# Patient Record
Sex: MALE | Race: WHITE | NOT HISPANIC OR LATINO | Employment: OTHER | URBAN - METROPOLITAN AREA
[De-identification: names, ages, dates, MRNs, and addresses within clinical notes are randomized per-mention and may not be internally consistent; named-entity substitution may affect disease eponyms.]

---

## 2017-04-25 DIAGNOSIS — G20 PARKINSON'S DISEASE (HCC): ICD-10-CM

## 2017-10-23 DIAGNOSIS — G20 PARKINSON'S DISEASE (HCC): ICD-10-CM

## 2017-10-26 ENCOUNTER — TELEPHONE (OUTPATIENT)
Dept: NEUROLOGY | Facility: CLINIC | Age: 82
End: 2017-10-26

## 2017-10-26 NOTE — TELEPHONE ENCOUNTER
I will refill his carbidopa/levodopa..he cancelled 3 appts with Cody Hernandes... He needs a F/U with her upon her return.    chrissy

## 2018-04-27 DIAGNOSIS — G20 PARKINSON'S DISEASE (HCC): ICD-10-CM

## 2018-08-13 ENCOUNTER — OFFICE VISIT (OUTPATIENT)
Dept: NEUROLOGY | Facility: CLINIC | Age: 83
End: 2018-08-13

## 2018-08-13 VITALS
HEART RATE: 66 BPM | HEIGHT: 73 IN | BODY MASS INDEX: 23.86 KG/M2 | OXYGEN SATURATION: 95 % | DIASTOLIC BLOOD PRESSURE: 66 MMHG | WEIGHT: 180 LBS | SYSTOLIC BLOOD PRESSURE: 130 MMHG

## 2018-08-13 DIAGNOSIS — G60.9 IDIOPATHIC PERIPHERAL NEUROPATHY: ICD-10-CM

## 2018-08-13 DIAGNOSIS — I69.30 SEQUELAE, POST-STROKE: ICD-10-CM

## 2018-08-13 DIAGNOSIS — R25.1 TREMOR: ICD-10-CM

## 2018-08-13 DIAGNOSIS — G20 PARKINSON'S DISEASE (HCC): Primary | ICD-10-CM

## 2018-08-13 DIAGNOSIS — M48.061 SPINAL STENOSIS OF LUMBAR REGION, UNSPECIFIED WHETHER NEUROGENIC CLAUDICATION PRESENT: ICD-10-CM

## 2018-08-13 PROCEDURE — 99213 OFFICE O/P EST LOW 20 MIN: CPT | Performed by: NURSE PRACTITIONER

## 2018-08-13 RX ORDER — CARVEDILOL 3.12 MG/1
3.12 TABLET ORAL 2 TIMES DAILY
COMMUNITY
Start: 2018-08-03 | End: 2018-09-02

## 2018-08-13 RX ORDER — HYDROXYZINE HYDROCHLORIDE 10 MG/1
10-20 TABLET, FILM COATED ORAL
COMMUNITY
Start: 2018-08-07 | End: 2018-09-06

## 2018-08-13 NOTE — PROGRESS NOTES
Subjective:     Patient ID: Anderson Quick is a 89 y.o. male presenting for follow up for peripheral neuropathy and tremor. Mr. Quick saw Dr. Naranjo on November 1, 2016. He has not been seen since that time. He is a previous patient of Dr. Caceres and Dr. Chun, however due to difficulty being seen by their office he was sent to see Dr. Naranjo.     He is taking gabapentin 3600 mg daily for his neuropathy which helps suppress his painful symptoms. If he misses a dose he can tell because his legs begin to ache quite a bit.     He takes carbidopa/levodopa 25/100 2 tablets three times daily for the tremor. He complains of tremor which is moderate to severe.  He can't write and he does not drive.  The tremor is mixed its present at rest intermittently as well as while doing active movements.  It affects both hands and to some degree both legs.     He has chronic back pain and his evaluation has demonstrated a severe compression fracture at L1 with 80-90% compression. There is some retropulsion at that level as well as some disc bulging at other levels but only mild spinal stenosis at any level. His CT scan report from 12/2014 showed the significant wedge fracture and also an L5 pars fracture.  He has been seen by pain management in [place] as well as Dr. Kramer and he has had 3 epidural steroid injections without much effect.  He states that while in his recliner he has no pain.  Sitting he has no pain.  Standing his pain increases and ambulating his pain increases.  It becomes quite severe.    He has history of stroke and he had evaluation 6/2012.  His evaluation included an MRI of the brain showing multifocal left frontal infarcts.  His carotid ultrasound showed less than 40% stenosis.  His echocardiogram showed moderate to severe aortic stenosis and a TAMRA was done showing grade 3 atheromatous of less than 5 mm in the ascending aorta but he also had a PFO.  A TCD with bubble study was positive for bubbles passing  through the PFO into the middle cerebral artery with Valsalva.  His Holter monitor ×2 showed sinus rhythm with some mild atrial tachycardia but no atrial fibrillation.  An EKG showed sinus rhythm.  He was anticoagulated.       Parkinson's Disease   Associated symptoms include neck pain. Pertinent negatives include no headaches, numbness or weakness.   Peripheral Neuropathy   Associated symptoms include neck pain. Pertinent negatives include no headaches, numbness or weakness.     The following portions of the patient's history were reviewed and updated as appropriate: allergies, current medications, past family history, past medical history, past social history, past surgical history and problem list.    Review of Systems   Musculoskeletal: Positive for back pain, gait problem and neck pain.   Neurological: Positive for dizziness and tremors. Negative for seizures, syncope, facial asymmetry, speech difficulty, weakness, light-headedness, numbness and headaches.   Hematological: Negative for adenopathy. Bruises/bleeds easily.   Psychiatric/Behavioral: Positive for confusion, decreased concentration and sleep disturbance. Negative for agitation, behavioral problems, dysphoric mood, hallucinations, self-injury and suicidal ideas. The patient is nervous/anxious. The patient is not hyperactive.         Objective:    Neurologic Exam  Mental status: Awake alert oriented and conversant provides a good history.  No evidence of an affective disorder, thought disorder, delusions or hallucinations.  Cranial nerves: 2-12 intact except for some reduced hearing and upgaze is perhaps reduced some.    Motor: Mild cogwheeling left arm.  Some cogwheeling right which seems to disappear with coaching.  Intermittent resting and action tremor.  Strength testing shows good strength in all muscles tested except abductor pollicis brevis muscles bilaterally are mildly weak and the extensor hallicus muscles are mildly weak.  Reflexes: Trace to  +1  Sensory: Light touch and pinprick diminished from the knees down fairly significantly in the feet.  Absent vibration sense at the ankles.  Position sense was intact in the great toes.  In the upper extremities there is perhaps mild reduction in the hands compared to the arms.  Cerebellar: Finger to nose, rapid movements are intact.  Heel-to-shin was intact.  Gait and Station: Broad-based with short steps.  Somewhat unsteady with flexed forward posture at the hips and head but he describes increased back pain with erect posture       Physical Exam  Gen.: Well-developed white male no acute distress  HEENT: Normocephalic no evidence of trauma.  He is status post cataract extractions.    Neck has some limited range of motion which appears arthritic.  It is held tilted forward.  He states that bringing it back in line increases his back pain.   Heart: Regular rate and rhythm no murmurs    Assessment/Plan:     Anderson was seen today for parkinson's disease and peripheral neuropathy.    Diagnoses and all orders for this visit:    Parkinson's disease (CMS/HCC)    Idiopathic peripheral neuropathy    Sequelae, post-stroke    Tremor    Spinal stenosis of lumbar region, unspecified whether neurogenic claudication present      Assessment:   1.  Idiopathic peripheral neuropathy-patient is on max dose gabapentin which seems to be necessary to control his painful symptoms in the legs.    2.  Chronic back pain with notable compression fracture, pars fracture and arthritis.  Only mild spinal stenosis at any level.  3.  History of stroke-good recovery.  Evaluation demonstrated a cardioembolic risk and so he is chronically anticoagulated.   4.  Mixed tremor-some features of parkinsonism despite being on carbidopa/levodopa plus action tremor.  He is on max dose gabapentin.  Primidone may interfere with warfarin.  Propranolol may increase depression.  He has history of kidney stones and so Topamax is not an option.  He is on chronic  or chronic analgesics which makes benzodiazepines risky for respiratory failure.  Unfortunately it appears that the action tremor is not treatable without notable risks of complications as alluded to here.    Plan:  1.  Continue gabapentin max dose 3600 mg per day  2.  Continue optimal risk factor control and anticoagulation  3.  No further specific recommendations on his back pain.    4.  Continue carbidopa/levodopa same dose.  Multiple potential side effect issues with medications for essential tremor    Follow up annually.

## 2018-11-14 DIAGNOSIS — G20 PARKINSON'S DISEASE (HCC): ICD-10-CM

## 2019-08-15 ENCOUNTER — OFFICE VISIT (OUTPATIENT)
Dept: NEUROLOGY | Facility: CLINIC | Age: 84
End: 2019-08-15

## 2019-08-15 VITALS
BODY MASS INDEX: 21.74 KG/M2 | DIASTOLIC BLOOD PRESSURE: 80 MMHG | WEIGHT: 164 LBS | OXYGEN SATURATION: 96 % | SYSTOLIC BLOOD PRESSURE: 124 MMHG | HEIGHT: 73 IN | HEART RATE: 57 BPM

## 2019-08-15 DIAGNOSIS — M48.062 SPINAL STENOSIS OF LUMBAR REGION WITH NEUROGENIC CLAUDICATION: ICD-10-CM

## 2019-08-15 DIAGNOSIS — I69.30 SEQUELAE, POST-STROKE: ICD-10-CM

## 2019-08-15 DIAGNOSIS — R25.1 TREMOR: ICD-10-CM

## 2019-08-15 DIAGNOSIS — G62.9 POLYNEUROPATHY: Primary | ICD-10-CM

## 2019-08-15 PROCEDURE — 99213 OFFICE O/P EST LOW 20 MIN: CPT | Performed by: NURSE PRACTITIONER

## 2019-08-15 NOTE — PROGRESS NOTES
Subjective:     Patient ID: Anderson Qucik is a 89 y.o. male presenting for follow up for peripheral neuropathy and tremor. Mr. Quick saw Dr. Naranjo on November 1, 2016. I saw him in follow up on August 13, 2018. He is a previous patient of Dr. Caceres and Dr. Chun, however due to difficulty being seen by their office he was sent to see Dr. Naranjo.      He is taking gabapentin 3600 mg daily for his neuropathy which helps suppress his painful symptoms. If he misses a dose he can tell because his legs begin to ache quite a bit.      He takes carbidopa/levodopa 25/100 2 tablets three times daily for the tremor. He complains of tremor which is moderate to severe.  He can't write and he does not drive.  The tremor is mixed its present at rest intermittently as well as while doing active movements.  It affects both hands and to some degree both legs.      He has chronic back pain and his evaluation has demonstrated a severe compression fracture at L1 with 80-90% compression. There is some retropulsion at that level as well as some disc bulging at other levels but only mild spinal stenosis at any level. His CT scan report from 12/2014 showed the significant wedge fracture and also an L5 pars fracture.  He has been seen by pain management in [place] as well as Dr. Kramer and he has had 3 epidural steroid injections without much effect.  He states that while in his recliner he has no pain.  Sitting he has no pain.  Standing his pain increases and ambulating his pain increases.  It becomes quite severe.     He has history of stroke and he had evaluation 6/2012.  His evaluation included an MRI of the brain showing multifocal left frontal infarcts.  His carotid ultrasound showed less than 40% stenosis.  His echocardiogram showed moderate to severe aortic stenosis and a TAMRA was done showing grade 3 atheromatous of less than 5 mm in the ascending aorta but he also had a PFO.  A TCD with bubble study was positive for bubbles  passing through the PFO into the middle cerebral artery with Valsalva.  His Holter monitor ×2 showed sinus rhythm with some mild atrial tachycardia but no atrial fibrillation.  An EKG showed sinus rhythm.  He was anticoagulated.       History of Present Illness  The following portions of the patient's history were reviewed and updated as appropriate: allergies, current medications, past family history, past medical history, past social history, past surgical history and problem list.    Review of Systems   Constitutional: Negative.    HENT: Negative.    Eyes: Negative.    Respiratory: Negative.    Cardiovascular: Negative.    Gastrointestinal: Negative.    Endocrine: Negative.    Genitourinary: Negative.    Musculoskeletal: Negative.    Neurological: Negative for dizziness, tremors, seizures, syncope, facial asymmetry, speech difficulty, weakness, light-headedness, numbness and headaches.   Hematological: Does not bruise/bleed easily.   Psychiatric/Behavioral: Negative for agitation, behavioral problems, confusion, decreased concentration, dysphoric mood, hallucinations, self-injury, sleep disturbance and suicidal ideas. The patient is not nervous/anxious and is not hyperactive.         Objective:    Neurologic Exam  Mental status: Awake alert oriented and conversant provides a good history.  No evidence of an affective disorder, thought disorder, delusions or hallucinations.  Cranial nerves: 2-12 intact except for some reduced hearing and upgaze is perhaps reduced some.    Motor: Mild cogwheeling left arm.  Some cogwheeling right which seems to disappear with coaching.  Intermittent resting and action tremor.  Strength testing shows good strength in all muscles tested except abductor pollicis brevis muscles bilaterally are mildly weak and the extensor hallicus muscles are mildly weak.  Reflexes: Trace to +1  Sensory: Light touch and pinprick diminished from the knees down fairly significantly in the feet.  Absent  vibration sense at the ankles.  Position sense was intact in the great toes.  In the upper extremities there is perhaps mild reduction in the hands compared to the arms.  Cerebellar: Finger to nose, rapid movements are intact.  Heel-to-shin was intact.  Gait and Station: Broad-based with short steps.  Somewhat unsteady with flexed forward posture at the hips and head but he describes increased back pain with erect posture       Physical Exam  Gen.: Well-developed white male no acute distress  HEENT: Normocephalic no evidence of trauma.  He is status post cataract extractions.    Neck has some limited range of motion which appears arthritic.  It is held tilted forward.  He states that bringing it back in line increases his back pain.   Heart: Regular rate and rhythm no murmurs     Assessment/Plan:  1.  Idiopathic peripheral neuropathy-patient is on max dose gabapentin which seems to be necessary to control his painful symptoms in the legs.    2.  Chronic back pain with notable compression fracture, pars fracture and arthritis.  Only mild spinal stenosis at any level.  3.  History of stroke-good recovery.  Evaluation demonstrated a cardioembolic risk and so he is chronically anticoagulated.   4.  Mixed tremor-some features of parkinsonism despite being on carbidopa/levodopa plus action tremor.  He is on max dose gabapentin.  Primidone may interfere with warfarin.  Propranolol may increase depression.  He has history of kidney stones and so Topamax is not an option.  He is on chronic or chronic analgesics which makes benzodiazepines risky for respiratory failure.  Unfortunately it appears that the action tremor is not treatable without notable risks of complications as alluded to here.     Anderson was seen today for parkinson's disease.    Diagnoses and all orders for this visit:    Polyneuropathy    Sequelae, post-stroke    Tremor    Spinal stenosis of lumbar region with neurogenic claudication         Plan:   1.   Continue gabapentin max dose 3600 mg per day  2.  Continue optimal risk factor control and anticoagulation  3.  No further specific recommendations on his back pain.    4.  Continue carbidopa/levodopa same dose.  Multiple potential side effect issues with medications for essential tremor     Follow up annually.

## 2019-10-25 DIAGNOSIS — G20 PARKINSON'S DISEASE (HCC): ICD-10-CM

## 2020-08-10 ENCOUNTER — APPOINTMENT (OUTPATIENT)
Dept: GENERAL RADIOLOGY | Facility: HOSPITAL | Age: 85
End: 2020-08-10

## 2020-08-10 ENCOUNTER — APPOINTMENT (OUTPATIENT)
Dept: CT IMAGING | Facility: HOSPITAL | Age: 85
End: 2020-08-10

## 2020-08-10 ENCOUNTER — HOSPITAL ENCOUNTER (OUTPATIENT)
Facility: HOSPITAL | Age: 85
Setting detail: OBSERVATION
Discharge: NURSING, REHAB OR LONG-TERM CARE | End: 2020-08-13
Attending: EMERGENCY MEDICINE | Admitting: HOSPITALIST

## 2020-08-10 DIAGNOSIS — S00.03XA CONTUSION OF SCALP, INITIAL ENCOUNTER: ICD-10-CM

## 2020-08-10 DIAGNOSIS — S93.602A FOOT SPRAIN, LEFT, INITIAL ENCOUNTER: ICD-10-CM

## 2020-08-10 DIAGNOSIS — G60.9 IDIOPATHIC PERIPHERAL NEUROPATHY: ICD-10-CM

## 2020-08-10 DIAGNOSIS — T81.49XA INFECTED SURGICAL WOUND: ICD-10-CM

## 2020-08-10 DIAGNOSIS — S82.832A CLOSED FRACTURE OF DISTAL END OF LEFT FIBULA, UNSPECIFIED FRACTURE MORPHOLOGY, INITIAL ENCOUNTER: ICD-10-CM

## 2020-08-10 DIAGNOSIS — R53.1 GENERALIZED WEAKNESS: Primary | ICD-10-CM

## 2020-08-10 LAB
ALBUMIN SERPL-MCNC: 3.5 G/DL (ref 3.5–5.2)
ALBUMIN/GLOB SERPL: 1 G/DL
ALP SERPL-CCNC: 47 U/L (ref 39–117)
ALT SERPL W P-5'-P-CCNC: <5 U/L (ref 1–41)
AMORPH URATE CRY URNS QL MICRO: ABNORMAL /HPF
ANION GAP SERPL CALCULATED.3IONS-SCNC: 12.3 MMOL/L (ref 5–15)
AST SERPL-CCNC: 11 U/L (ref 1–40)
BACTERIA UR QL AUTO: ABNORMAL /HPF
BASOPHILS # BLD AUTO: 0.05 10*3/MM3 (ref 0–0.2)
BASOPHILS NFR BLD AUTO: 0.7 % (ref 0–1.5)
BILIRUB SERPL-MCNC: 0.4 MG/DL (ref 0–1.2)
BILIRUB UR QL STRIP: NEGATIVE
BUN SERPL-MCNC: 15 MG/DL (ref 8–23)
BUN/CREAT SERPL: 18.5 (ref 7–25)
CALCIUM SPEC-SCNC: 8.9 MG/DL (ref 8.2–9.6)
CHLORIDE SERPL-SCNC: 100 MMOL/L (ref 98–107)
CLARITY UR: ABNORMAL
CO2 SERPL-SCNC: 28.7 MMOL/L (ref 22–29)
COLOR UR: ABNORMAL
CREAT SERPL-MCNC: 0.81 MG/DL (ref 0.76–1.27)
DEPRECATED RDW RBC AUTO: 52 FL (ref 37–54)
EOSINOPHIL # BLD AUTO: 0.26 10*3/MM3 (ref 0–0.4)
EOSINOPHIL NFR BLD AUTO: 3.4 % (ref 0.3–6.2)
ERYTHROCYTE [DISTWIDTH] IN BLOOD BY AUTOMATED COUNT: 14.5 % (ref 12.3–15.4)
GFR SERPL CREATININE-BSD FRML MDRD: 89 ML/MIN/1.73
GLOBULIN UR ELPH-MCNC: 3.4 GM/DL
GLUCOSE SERPL-MCNC: 95 MG/DL (ref 65–99)
GLUCOSE UR STRIP-MCNC: NEGATIVE MG/DL
HCT VFR BLD AUTO: 35.1 % (ref 37.5–51)
HGB BLD-MCNC: 11.6 G/DL (ref 13–17.7)
HGB UR QL STRIP.AUTO: ABNORMAL
HYALINE CASTS UR QL AUTO: ABNORMAL /LPF
IMM GRANULOCYTES # BLD AUTO: 0.04 10*3/MM3 (ref 0–0.05)
IMM GRANULOCYTES NFR BLD AUTO: 0.5 % (ref 0–0.5)
INR PPP: 1.1 (ref 0.9–1.1)
KETONES UR QL STRIP: ABNORMAL
LEUKOCYTE ESTERASE UR QL STRIP.AUTO: ABNORMAL
LYMPHOCYTES # BLD AUTO: 0.93 10*3/MM3 (ref 0.7–3.1)
LYMPHOCYTES NFR BLD AUTO: 12.2 % (ref 19.6–45.3)
MCH RBC QN AUTO: 32.6 PG (ref 26.6–33)
MCHC RBC AUTO-ENTMCNC: 33 G/DL (ref 31.5–35.7)
MCV RBC AUTO: 98.6 FL (ref 79–97)
MONOCYTES # BLD AUTO: 0.63 10*3/MM3 (ref 0.1–0.9)
MONOCYTES NFR BLD AUTO: 8.2 % (ref 5–12)
NEUTROPHILS NFR BLD AUTO: 5.73 10*3/MM3 (ref 1.7–7)
NEUTROPHILS NFR BLD AUTO: 75 % (ref 42.7–76)
NITRITE UR QL STRIP: NEGATIVE
NRBC BLD AUTO-RTO: 0.1 /100 WBC (ref 0–0.2)
PH UR STRIP.AUTO: 8.5 [PH] (ref 5–8)
PLATELET # BLD AUTO: 239 10*3/MM3 (ref 140–450)
PMV BLD AUTO: 10.1 FL (ref 6–12)
POTASSIUM SERPL-SCNC: 4.2 MMOL/L (ref 3.5–5.2)
PROT SERPL-MCNC: 6.9 G/DL (ref 6–8.5)
PROT UR QL STRIP: ABNORMAL
PROTHROMBIN TIME: 14.1 SECONDS (ref 11.7–14.2)
RBC # BLD AUTO: 3.56 10*6/MM3 (ref 4.14–5.8)
RBC # UR: ABNORMAL /HPF
REF LAB TEST METHOD: ABNORMAL
SODIUM SERPL-SCNC: 141 MMOL/L (ref 136–145)
SP GR UR STRIP: 1.02 (ref 1–1.03)
SQUAMOUS #/AREA URNS HPF: ABNORMAL /HPF
TROPONIN T SERPL-MCNC: <0.01 NG/ML (ref 0–0.03)
UROBILINOGEN UR QL STRIP: ABNORMAL
WBC # BLD AUTO: 7.64 10*3/MM3 (ref 3.4–10.8)
WBC UR QL AUTO: ABNORMAL /HPF

## 2020-08-10 PROCEDURE — 73610 X-RAY EXAM OF ANKLE: CPT

## 2020-08-10 PROCEDURE — U0004 COV-19 TEST NON-CDC HGH THRU: HCPCS | Performed by: PHYSICIAN ASSISTANT

## 2020-08-10 PROCEDURE — 70450 CT HEAD/BRAIN W/O DYE: CPT

## 2020-08-10 PROCEDURE — 81001 URINALYSIS AUTO W/SCOPE: CPT | Performed by: PHYSICIAN ASSISTANT

## 2020-08-10 PROCEDURE — G0378 HOSPITAL OBSERVATION PER HR: HCPCS

## 2020-08-10 PROCEDURE — C9803 HOPD COVID-19 SPEC COLLECT: HCPCS

## 2020-08-10 PROCEDURE — 85025 COMPLETE CBC W/AUTO DIFF WBC: CPT | Performed by: PHYSICIAN ASSISTANT

## 2020-08-10 PROCEDURE — 93010 ELECTROCARDIOGRAM REPORT: CPT | Performed by: INTERNAL MEDICINE

## 2020-08-10 PROCEDURE — 93005 ELECTROCARDIOGRAM TRACING: CPT | Performed by: PHYSICIAN ASSISTANT

## 2020-08-10 PROCEDURE — 80053 COMPREHEN METABOLIC PANEL: CPT | Performed by: PHYSICIAN ASSISTANT

## 2020-08-10 PROCEDURE — 73630 X-RAY EXAM OF FOOT: CPT

## 2020-08-10 PROCEDURE — 71045 X-RAY EXAM CHEST 1 VIEW: CPT

## 2020-08-10 PROCEDURE — 85610 PROTHROMBIN TIME: CPT | Performed by: PHYSICIAN ASSISTANT

## 2020-08-10 PROCEDURE — 99285 EMERGENCY DEPT VISIT HI MDM: CPT

## 2020-08-10 PROCEDURE — 84484 ASSAY OF TROPONIN QUANT: CPT | Performed by: PHYSICIAN ASSISTANT

## 2020-08-10 RX ORDER — CEPHALEXIN 500 MG/1
500 CAPSULE ORAL ONCE
Status: COMPLETED | OUTPATIENT
Start: 2020-08-10 | End: 2020-08-10

## 2020-08-10 RX ORDER — PANTOPRAZOLE SODIUM 40 MG/10ML
40 INJECTION, POWDER, LYOPHILIZED, FOR SOLUTION INTRAVENOUS
Status: DISCONTINUED | OUTPATIENT
Start: 2020-08-11 | End: 2020-08-11

## 2020-08-10 RX ORDER — OXYCODONE AND ACETAMINOPHEN 10; 325 MG/1; MG/1
1 TABLET ORAL EVERY 6 HOURS PRN
Status: DISCONTINUED | OUTPATIENT
Start: 2020-08-10 | End: 2020-08-13

## 2020-08-10 RX ORDER — ASPIRIN AND DIPYRIDAMOLE 25; 200 MG/1; MG/1
1 CAPSULE, EXTENDED RELEASE ORAL 2 TIMES DAILY
COMMUNITY

## 2020-08-10 RX ORDER — SODIUM CHLORIDE 9 MG/ML
50 INJECTION, SOLUTION INTRAVENOUS CONTINUOUS
Status: DISCONTINUED | OUTPATIENT
Start: 2020-08-10 | End: 2020-08-12

## 2020-08-10 RX ORDER — BRIMONIDINE TARTRATE AND TIMOLOL MALEATE 2; 5 MG/ML; MG/ML
1 SOLUTION OPHTHALMIC EVERY 12 HOURS
COMMUNITY

## 2020-08-10 RX ORDER — DOCUSATE SODIUM 100 MG/1
100 CAPSULE, LIQUID FILLED ORAL DAILY
COMMUNITY

## 2020-08-10 RX ORDER — OXYCODONE AND ACETAMINOPHEN 10; 325 MG/1; MG/1
1 TABLET ORAL ONCE
Status: COMPLETED | OUTPATIENT
Start: 2020-08-10 | End: 2020-08-10

## 2020-08-10 RX ORDER — TRAVOPROST OPHTHALMIC SOLUTION 0.04 MG/ML
1 SOLUTION OPHTHALMIC EVERY EVENING
COMMUNITY
End: 2020-08-10

## 2020-08-10 RX ADMIN — OXYCODONE HYDROCHLORIDE AND ACETAMINOPHEN 1 TABLET: 10; 325 TABLET ORAL at 22:24

## 2020-08-10 RX ADMIN — CEPHALEXIN 500 MG: 500 CAPSULE ORAL at 15:34

## 2020-08-10 RX ADMIN — OXYCODONE HYDROCHLORIDE AND ACETAMINOPHEN 1 TABLET: 10; 325 TABLET ORAL at 15:34

## 2020-08-10 NOTE — ED NOTES
.Pt masked on arrival, RN wearing mask/goggles during encounter       Jennie Valenzuela, RN  08/10/20 [id number]

## 2020-08-10 NOTE — ED PROVIDER NOTES
EMERGENCY DEPARTMENT ENCOUNTER    Room Number:  S515/1  Date seen:  8/10/2020  Time seen: 2:33 PM  PCP: David Ortiz MD  Historian: patient and wife at bedside      HPI:  Chief Complaint: generalized weakness, fall    A complete HPI/ROS/PMH/PSH/SH/FH are unobtainable due to: none    Context: Anderson Quick is a 89 y.o. male who presents to the ED for evaluation of generalized weakness.  It was gradual in onset 4 days ago and nothing seemed to make it worse or better.  He fell twice on the first day, injuring his left foot and ankle.  He also struck his head.  He denies any loss of consciousness, headache, vision changes nausea or vomiting.  He also denies any neck or back pain as well as any other extremity pain.  He denies fevers, chills, cough, chest pain shortness of breath abdominal pain diarrhea and dysuria.  He has noticed some intermittent hematuria recently.  He also recently had a Mohs surgery to the right cheek and it is having some drainage, they are concerned for infection.        PAST MEDICAL HISTORY  Active Ambulatory Problems     Diagnosis Date Noted   • No Active Ambulatory Problems     Resolved Ambulatory Problems     Diagnosis Date Noted   • No Resolved Ambulatory Problems     Past Medical History:   Diagnosis Date   • Arthritis    • Chronic pain    • Depression    • Movement disorder    • Peripheral neuropathy    • Stroke (CMS/HCC)          PAST SURGICAL HISTORY  Past Surgical History:   Procedure Laterality Date   • CARDIAC VALVE REPLACEMENT  2014         FAMILY HISTORY  Family History   Problem Relation Age of Onset   • Stroke Father    • Parkinsonism Paternal Aunt          SOCIAL HISTORY  Social History     Socioeconomic History   • Marital status:      Spouse name: Not on file   • Number of children: Not on file   • Years of education: Not on file   • Highest education level: Not on file   Tobacco Use   • Smoking status: Former Smoker     Packs/day: 0.25     Years: 5.00     Pack  years: 1.25     Types: Cigarettes     Start date:      Last attempt to quit: 1970     Years since quittin.6   • Smokeless tobacco: Never Used   Substance and Sexual Activity   • Alcohol use: Yes     Alcohol/week: 1.0 standard drinks     Types: 1 Shots of liquor per week     Comment: 1 drink with dinner daily    • Drug use: No         ALLERGIES  Tape  [adhesive tape]        REVIEW OF SYSTEMS  Review of Systems     All systems reviewed and negative except for those discussed in HPI.       PHYSICAL EXAM  ED Triage Vitals   Temp Heart Rate Resp BP SpO2   08/10/20 0955 08/10/20 0955 08/10/20 0955 08/10/20 0955 08/10/20 0955   97.8 °F (36.6 °C) 84 16 136/78 100 %      Temp src Heart Rate Source Patient Position BP Location FiO2 (%)   08/10/20 0955 08/10/20 0955 08/10/20 1116 08/10/20 1116 --   Tympanic Monitor Lying Left arm          GENERAL: not distressed  HENT: There is some faint yellowish bruising to the right frontotemporal area without edema or deformity.  No hemotympanum, no barnett sign or raccoon eyes or otorrhea or rhinorrhea or septal hematoma.  There is a soft tissue defect in the right cheek from recent Mohs surgery that is draining purulent drainage.  EYES: no scleral icterus, PERRLA, extraocular movements intact  CV: regular rhythm, regular rate  RESPIRATORY: normal effort CTA B  ABDOMEN: soft, nontender, nondistended  MUSCULOSKELETAL: Significant amount of ecchymosis and 2+ pitting edema to the left foot and ankle.  DP and PT pulses are 1+, sensation is intact distally and cap refill is less than 2 seconds.  Limited range of motion of the left ankle due to pain.  There is no proximal tib-fib tenderness or metatarsal tenderness.  NEURO: alert, moves all extremities, follows commands  SKIN: warm, dry    Vital signs and nursing notes reviewed.          LAB RESULTS  Recent Results (from the past 24 hour(s))   Comprehensive Metabolic Panel    Collection Time: 08/10/20 11:52 AM   Result Value Ref Range     Glucose 95 65 - 99 mg/dL    BUN 15 8 - 23 mg/dL    Creatinine 0.81 0.76 - 1.27 mg/dL    Sodium 141 136 - 145 mmol/L    Potassium 4.2 3.5 - 5.2 mmol/L    Chloride 100 98 - 107 mmol/L    CO2 28.7 22.0 - 29.0 mmol/L    Calcium 8.9 8.2 - 9.6 mg/dL    Total Protein 6.9 6.0 - 8.5 g/dL    Albumin 3.50 3.50 - 5.20 g/dL    ALT (SGPT) <5 1 - 41 U/L    AST (SGOT) 11 1 - 40 U/L    Alkaline Phosphatase 47 39 - 117 U/L    Total Bilirubin 0.4 0.0 - 1.2 mg/dL    eGFR Non African Amer 89 >60 mL/min/1.73    Globulin 3.4 gm/dL    A/G Ratio 1.0 g/dL    BUN/Creatinine Ratio 18.5 7.0 - 25.0    Anion Gap 12.3 5.0 - 15.0 mmol/L   Troponin    Collection Time: 08/10/20 11:52 AM   Result Value Ref Range    Troponin T <0.010 0.000 - 0.030 ng/mL   Protime-INR    Collection Time: 08/10/20 11:52 AM   Result Value Ref Range    Protime 14.1 11.7 - 14.2 Seconds    INR 1.10 0.90 - 1.10   CBC Auto Differential    Collection Time: 08/10/20 11:52 AM   Result Value Ref Range    WBC 7.64 3.40 - 10.80 10*3/mm3    RBC 3.56 (L) 4.14 - 5.80 10*6/mm3    Hemoglobin 11.6 (L) 13.0 - 17.7 g/dL    Hematocrit 35.1 (L) 37.5 - 51.0 %    MCV 98.6 (H) 79.0 - 97.0 fL    MCH 32.6 26.6 - 33.0 pg    MCHC 33.0 31.5 - 35.7 g/dL    RDW 14.5 12.3 - 15.4 %    RDW-SD 52.0 37.0 - 54.0 fl    MPV 10.1 6.0 - 12.0 fL    Platelets 239 140 - 450 10*3/mm3    Neutrophil % 75.0 42.7 - 76.0 %    Lymphocyte % 12.2 (L) 19.6 - 45.3 %    Monocyte % 8.2 5.0 - 12.0 %    Eosinophil % 3.4 0.3 - 6.2 %    Basophil % 0.7 0.0 - 1.5 %    Immature Grans % 0.5 0.0 - 0.5 %    Neutrophils, Absolute 5.73 1.70 - 7.00 10*3/mm3    Lymphocytes, Absolute 0.93 0.70 - 3.10 10*3/mm3    Monocytes, Absolute 0.63 0.10 - 0.90 10*3/mm3    Eosinophils, Absolute 0.26 0.00 - 0.40 10*3/mm3    Basophils, Absolute 0.05 0.00 - 0.20 10*3/mm3    Immature Grans, Absolute 0.04 0.00 - 0.05 10*3/mm3    nRBC 0.1 0.0 - 0.2 /100 WBC   Urinalysis With Microscopic If Indicated (No Culture) - Urine, Clean Catch    Collection Time:  08/10/20  2:34 PM   Result Value Ref Range    Color, UA Dark Yellow (A) Yellow, Straw    Appearance, UA Turbid (A) Clear    pH, UA 8.5 (H) 5.0 - 8.0    Specific Gravity, UA 1.024 1.005 - 1.030    Glucose, UA Negative Negative    Ketones, UA Trace (A) Negative    Bilirubin, UA Negative Negative    Blood, UA Large (3+) (A) Negative    Protein, UA 30 mg/dL (1+) (A) Negative    Leuk Esterase, UA Trace (A) Negative    Nitrite, UA Negative Negative    Urobilinogen, UA 0.2 E.U./dL 0.2 - 1.0 E.U./dL   Urinalysis, Microscopic Only - Urine, Clean Catch    Collection Time: 08/10/20  2:34 PM   Result Value Ref Range    RBC, UA 6-12 (A) None Seen, 0-2 /HPF    WBC, UA 6-12 (A) None Seen, 0-2 /HPF    Bacteria, UA None Seen None Seen /HPF    Squamous Epithelial Cells, UA 0-2 None Seen, 0-2 /HPF    Hyaline Casts, UA 3-6 None Seen /LPF    Amorphous Crystals, UA Large/3+ None Seen /HPF    Methodology Manual Light Microscopy        Ordered the above labs and independently reviewed the results.        RADIOLOGY  CT Head Without Contrast   Final Result       No evidence for acute traumatic intracranial pathology.       Radiation dose reduction techniques were utilized, including automated   exposure control and exposure modulation based on body size.       This report was finalized on 8/10/2020 3:19 PM by Dr. Samuel Shankar M.D.          XR Foot 3+ View Left   Final Result   1. Mildly displaced oblique distal fibular diametaphyseal fracture with   soft tissue swelling.   2. No evidence for active disease in the chest.       This report was finalized on 8/10/2020 1:22 PM by Dr. Gray Arboleda M.D.          XR Ankle 3+ View Left   Final Result   1. Mildly displaced oblique distal fibular diametaphyseal fracture with   soft tissue swelling.   2. No evidence for active disease in the chest.       This report was finalized on 8/10/2020 1:22 PM by Dr. Gray Arboleda M.D.          XR Chest 1 View   Final Result   1. Mildly displaced  oblique distal fibular diametaphyseal fracture with   soft tissue swelling.   2. No evidence for active disease in the chest.       This report was finalized on 8/10/2020 1:22 PM by Dr. Gray Arboleda M.D.              I ordered the above noted radiological studies. Reviewed by me and discussed with radiologist.  See dictation for official radiology interpretation.    PROCEDURES  Splint - Cast - Strapping  Date/Time: 8/10/2020 8:32 PM  Performed by: Madison Ramos PA  Authorized by: Samuel Arroyo MD     Consent:     Consent obtained:  Verbal    Consent given by:  Patient    Risks discussed:  Discoloration and numbness    Alternatives discussed:  No treatment  Pre-procedure details:     Sensation:  Normal    Skin color:  Natural  Procedure details:     Laterality:  Left    Location:  Ankle    Ankle:  L ankle    Splint type:  Sugar tong    Supplies:  Elastic bandage, Ortho-Glass and cotton padding  Post-procedure details:     Pain:  Improved    Sensation:  Normal    Skin color:  Natural    Patient tolerance of procedure:  Tolerated well, no immediate complications            MEDICATIONS GIVEN IN ER  Medications   sodium chloride 0.9 % infusion (has no administration in time range)   pantoprazole (PROTONIX) injection 40 mg (has no administration in time range)   oxyCODONE-acetaminophen (PERCOCET)  MG per tablet 1 tablet (has no administration in time range)   oxyCODONE-acetaminophen (PERCOCET)  MG per tablet 1 tablet (1 tablet Oral Given 8/10/20 1534)   cephalexin (KEFLEX) capsule 500 mg (500 mg Oral Given 8/10/20 1534)             PROGRESS AND CONSULTS    DDX includes but not limited to dehydration, electrolyte abnormality, infection such as UTI or pneumonia    ED Course as of Aug 10 2034   Mon Aug 10, 2020   1255 My interpretation of the EKG performed at 11:58 AM is sinus rhythm, rate 71.  Normal axis, 1 PVC, normal KEN QRS and QTc.  No ST elevation or ischemic T wave changes.    [KA]   1351  Troponin T: <0.010 [KA]   1351 RBC(!): 3.56 [KA]   1351 Hemoglobin(!): 11.6 [KA]   1416 I discussed the CT head with Dr. Shankar who states no acute intracranial findings.    [KA]   1445 I discussed the labs and imaging with the patient and his wife.  Though his labs are essentially unremarkable, he is still generally weak and has an acute fracture of the left ankle, further limiting his mobility.  Plan is for admission for further evaluation and treatment and they are agreeable.    [KA]   1553 I discussed the patient with Dr. Arroyo who agrees to admit to telemetry.    [KA]   2031 Bacteria, UA: None Seen [KA]   2031 Nitrite, UA: Negative [KA]      ED Course User Index  [KA] Madison Ramos PA        Reviewed pt's history and workup with Dr. Nunez.  After a bedside evaluation; they agree with the plan of care      Patient was placed in face mask in first look. Patient was wearing facemask each time I entered the room and throughout our encounter. I wore protective equipment throughout this patient encounter including a face mask, eye shield and gloves. Hand hygiene was performed before donning protective equipment and after removal when leaving the room.        DIAGNOSIS  Final diagnoses:   Generalized weakness   Closed fracture of distal end of left fibula, unspecified fracture morphology, initial encounter   Foot sprain, left, initial encounter   Infected surgical wound   Contusion of scalp, initial encounter               Latest Documented Vital Signs:  As of 20:34  BP- 117/66 HR- 75 Temp- 98 °F (36.7 °C) (Oral) O2 sat- 99%       Madison Ramos PA  08/10/20 2034

## 2020-08-10 NOTE — ED TRIAGE NOTES
Patient had multiple falls on Friday. He refused EMS both times they came to his home. He c/o lower extremity weakness/pain and left ankle pain/swelling. He also has a place on his face where he had a mole removed and he reports it may be infected.

## 2020-08-10 NOTE — PROGRESS NOTES
Discharge Planning Assessment  [facility]     Patient Name: Anderson Quick  MRN: [id number]  Today's Date: 8/10/2020    Admit Date: 8/10/2020    Discharge Needs Assessment     Row Name 08/10/20 1733       Living Environment    Lives With  spouse    Name(s) of Who Lives With Patient  Sherly Quick    Current Living Arrangements  home/apartment/condo    Primary Care Provided by  spouse/significant other    Provides Primary Care For  no one, unable/limited ability to care for self    Caregiving Concerns  Spouse states that she is not able to care for spouse if he requires transfers or lifting    Family Caregiver if Needed  none    Quality of Family Relationships  supportive    Able to Return to Prior Arrangements  other (see comments) TBD       Resource/Environmental Concerns    Resource/Environmental Concerns  none       Transition Planning    Patient/Family Anticipates Transition to  other (see comments) TBD, pt states that he is not sure what his plans are or what they should be as he has a broken ankle and he is not sure if his wife can care for him    Patient/Family Anticipated Services at Transition  ;home health care;skilled nursing    Transportation Anticipated  family or friend will provide       Discharge Needs Assessment    Readmission Within the Last 30 Days  no previous admission in last 30 days    Concerns to be Addressed  discharge planning    Concerns Comments  May need rehab placement    Equipment Currently Used at Home  cane, straight;walker, standard    Anticipated Changes Related to Illness  inability to care for self    Equipment Needed After Discharge  other (see comments) Undetermined at this time        Discharge Plan     Row Name 08/10/20 1737       Plan    Plan  Face sheet verified , role of CCP explained. Pt denies any difficulty in paying for medication. Pt d/c undetermined at this time. May need rehab or home health. Spouse stated that she cannot lift or transfer pt if he would  require that    Plan Comments  Disposition unknown at this time. Pt may need rehab or home health. Wife states that she cannot care for pt at home if he requires lifting or transfers        Destination      Coordination has not been started for this encounter.      Durable Medical Equipment      Coordination has not been started for this encounter.      Dialysis/Infusion      Coordination has not been started for this encounter.      Home Medical Care      Coordination has not been started for this encounter.      Therapy      Coordination has not been started for this encounter.      Community Resources      Coordination has not been started for this encounter.          Demographic Summary    No documentation.       Functional Status    No documentation.       Psychosocial    No documentation.       Abuse/Neglect    No documentation.       Legal    No documentation.       Substance Abuse    No documentation.       Patient Forms    No documentation.           Jacinda Rizvi RN

## 2020-08-10 NOTE — ED NOTES
Pt reports fall at home on Friday but did not want to be seen. Reports a second fall today. Pt reports hitting his head but denies LOC. Reports a generalized pain all over and rates it as a 10/10. BP currently 112/73 and breathing even and labored. 3+ pitting edema noted to LLE, currently immobilized by ems, LLQ warm and pink pedal pulse felt to RLQ. Patient was placed in face mask during first look triage.  Patient was wearing a face mask throughout encounter.  I wore personal protective equipment throughout the encounter.  Hand hygiene was performed before and after patient encounter.        Madison Colon, RN  08/10/20 1637

## 2020-08-10 NOTE — ED PROVIDER NOTES
MD ATTESTATION NOTE    The AGA and I have discussed this patient's history, physical exam, and treatment plan.  I have reviewed the documentation and personally had a face to face interaction with the patient. I affirm the documentation and agree with the treatment and plan.  The attached note describes my personal findings.    Pt with L ankle pain after fall that occurred 2 days ago. He reports that he fell d/t weakness. He does state striking his head, but denies any LOC. He is on Aggrenox.    Patient was placed in face mask in first look. Patient was wearing facemask when I entered the room and throughout our encounter. I wore full protective equipment throughout this patient encounter including a face mask, eye shield, and gloves. Hand hygiene was performed before donning protective equipment and after removal when leaving the room.    Limited physical exam:  Patient is nontoxic appearing and in NAD  HENT: R facial wound with serosanguinous drainage  Back/extremities: tenderness and swelling L ankle.    Plan to check imaging for fx.      Documentation assistance provided by madyson Barros for Dr Nunez.  Information recorded by the scribe was done at my direction and has been verified and validated by me.       Tigre Barros  08/10/20 5218       Antwon Nunez MD  08/10/20 8969

## 2020-08-10 NOTE — ED NOTES
Temporary splint applied to left LLE, cap refill <3 seconds.        Madison Colon RN  08/10/20 4845

## 2020-08-10 NOTE — PROGRESS NOTES
Clinical Pharmacy Services: Medication History    Anderson Quick is a 89 y.o. male presenting to [facility] for   Chief Complaint   Patient presents with   • Fall       He  has a past medical history of Arthritis, Chronic pain, Depression, Movement disorder, Peripheral neuropathy, and Stroke (CMS/[facility]).    Allergies as of 08/10/2020 - Reviewed 08/10/2020   Allergen Reaction Noted   • Tape  [adhesive tape]  11/01/2016       Medication information was obtained from: patient and pharmacy  Pharmacy and Phone Number:   [facility] - [address] [facility] [address] [facility] - [phone]  - [phone]   [address] [facility] [address]  Phone: [phone] Fax: [phone]    [facility] - [place]  [address] [facility] [address]  Phone: [phone] Fax: [phone]        Prior to Admission Medications     Prescriptions Last Dose Informant Patient Reported? Taking?    aspirin-dipyridamole (AGGRENOX)  MG per 12 hr capsule  Self Yes Yes    Take 1 capsule by mouth 2 (Two) Times a Day.    brimonidine-timolol (COMBIGAN) 0.2-0.5 % ophthalmic solution  Pharmacy Yes Yes    Administer 1 drop to both eyes Every 12 (Twelve) Hours.    carbidopa-levodopa (SINEMET)  MG per tablet  Self Yes Yes    Take 2 tablets by mouth 3 (Three) Times a Day.    docusate sodium (COLACE) 100 MG capsule  Self Yes Yes    Take 100 mg by mouth Daily.    gabapentin (NEURONTIN) 600 MG tablet  Self No Yes    Take 2 tablets by mouth 3 (Three) Times a Day.    oxyCODONE-acetaminophen (PERCOCET)  MG per tablet  Self Yes Yes    Take 1 tablet by mouth Every 6 (Six) Hours As Needed for moderate pain (4-6).    rosuvastatin (CRESTOR) 5 MG tablet  Self Yes Yes    Take 5 mg by mouth Daily.            Medication notes:     This medication list is complete to the best of my knowledge as of 8/10/2020    Please call if questions.    Dahiana Murillo [facility]  Medication History  Technician  [phone]    8/10/2020 15:52

## 2020-08-10 NOTE — ED NOTES
All triage performed with this RN wearing appropriate PPE.  Pt placed in mask upon arrival to ED.       Jacey Donovan, RN  08/10/20 0957

## 2020-08-11 LAB
ANION GAP SERPL CALCULATED.3IONS-SCNC: 7.9 MMOL/L (ref 5–15)
BUN SERPL-MCNC: 14 MG/DL (ref 8–23)
BUN/CREAT SERPL: 20 (ref 7–25)
CALCIUM SPEC-SCNC: 8.9 MG/DL (ref 8.2–9.6)
CHLORIDE SERPL-SCNC: 104 MMOL/L (ref 98–107)
CO2 SERPL-SCNC: 27.1 MMOL/L (ref 22–29)
CREAT SERPL-MCNC: 0.7 MG/DL (ref 0.76–1.27)
DEPRECATED RDW RBC AUTO: 50.1 FL (ref 37–54)
ERYTHROCYTE [DISTWIDTH] IN BLOOD BY AUTOMATED COUNT: 14.4 % (ref 12.3–15.4)
GFR SERPL CREATININE-BSD FRML MDRD: 105 ML/MIN/1.73
GLUCOSE SERPL-MCNC: 98 MG/DL (ref 65–99)
HCT VFR BLD AUTO: 29.9 % (ref 37.5–51)
HGB BLD-MCNC: 10.1 G/DL (ref 13–17.7)
MCH RBC QN AUTO: 32.9 PG (ref 26.6–33)
MCHC RBC AUTO-ENTMCNC: 33.8 G/DL (ref 31.5–35.7)
MCV RBC AUTO: 97.4 FL (ref 79–97)
PLATELET # BLD AUTO: 211 10*3/MM3 (ref 140–450)
PMV BLD AUTO: 9.4 FL (ref 6–12)
POTASSIUM SERPL-SCNC: 4.2 MMOL/L (ref 3.5–5.2)
RBC # BLD AUTO: 3.07 10*6/MM3 (ref 4.14–5.8)
SODIUM SERPL-SCNC: 139 MMOL/L (ref 136–145)
WBC # BLD AUTO: 5.22 10*3/MM3 (ref 3.4–10.8)

## 2020-08-11 PROCEDURE — 25010000003 CEFAZOLIN 1-4 GM/50ML-% SOLUTION: Performed by: HOSPITALIST

## 2020-08-11 PROCEDURE — 96361 HYDRATE IV INFUSION ADD-ON: CPT

## 2020-08-11 PROCEDURE — 96375 TX/PRO/DX INJ NEW DRUG ADDON: CPT

## 2020-08-11 PROCEDURE — 97110 THERAPEUTIC EXERCISES: CPT

## 2020-08-11 PROCEDURE — 97162 PT EVAL MOD COMPLEX 30 MIN: CPT

## 2020-08-11 PROCEDURE — G0378 HOSPITAL OBSERVATION PER HR: HCPCS

## 2020-08-11 PROCEDURE — 80048 BASIC METABOLIC PNL TOTAL CA: CPT | Performed by: HOSPITALIST

## 2020-08-11 PROCEDURE — 85027 COMPLETE CBC AUTOMATED: CPT | Performed by: HOSPITALIST

## 2020-08-11 RX ORDER — GABAPENTIN 300 MG/1
600 CAPSULE ORAL EVERY 8 HOURS SCHEDULED
Status: DISCONTINUED | OUTPATIENT
Start: 2020-08-11 | End: 2020-08-13 | Stop reason: HOSPADM

## 2020-08-11 RX ORDER — OXYCODONE AND ACETAMINOPHEN 10; 325 MG/1; MG/1
1 TABLET ORAL ONCE
Status: DISCONTINUED | OUTPATIENT
Start: 2020-08-11 | End: 2020-08-11

## 2020-08-11 RX ORDER — ROSUVASTATIN CALCIUM 5 MG/1
5 TABLET, COATED ORAL DAILY
Status: DISCONTINUED | OUTPATIENT
Start: 2020-08-11 | End: 2020-08-13 | Stop reason: HOSPADM

## 2020-08-11 RX ORDER — CEFAZOLIN SODIUM 1 G/50ML
1 INJECTION, SOLUTION INTRAVENOUS EVERY 8 HOURS
Status: DISCONTINUED | OUTPATIENT
Start: 2020-08-11 | End: 2020-08-13

## 2020-08-11 RX ORDER — BRIMONIDINE TARTRATE AND TIMOLOL MALEATE 2; 5 MG/ML; MG/ML
1 SOLUTION OPHTHALMIC EVERY 12 HOURS
Status: DISCONTINUED | OUTPATIENT
Start: 2020-08-11 | End: 2020-08-13 | Stop reason: HOSPADM

## 2020-08-11 RX ORDER — ASPIRIN AND DIPYRIDAMOLE 25; 200 MG/1; MG/1
1 CAPSULE, EXTENDED RELEASE ORAL 2 TIMES DAILY
Status: DISCONTINUED | OUTPATIENT
Start: 2020-08-11 | End: 2020-08-13 | Stop reason: HOSPADM

## 2020-08-11 RX ORDER — PANTOPRAZOLE SODIUM 40 MG/1
40 TABLET, DELAYED RELEASE ORAL
Status: DISCONTINUED | OUTPATIENT
Start: 2020-08-11 | End: 2020-08-13 | Stop reason: HOSPADM

## 2020-08-11 RX ORDER — CEPHALEXIN 500 MG/1
500 CAPSULE ORAL ONCE
Status: DISCONTINUED | OUTPATIENT
Start: 2020-08-11 | End: 2020-08-11

## 2020-08-11 RX ORDER — DOCUSATE SODIUM 100 MG/1
100 CAPSULE, LIQUID FILLED ORAL DAILY
Status: DISCONTINUED | OUTPATIENT
Start: 2020-08-11 | End: 2020-08-13 | Stop reason: HOSPADM

## 2020-08-11 RX ADMIN — PANTOPRAZOLE SODIUM 40 MG: 40 TABLET, DELAYED RELEASE ORAL at 14:37

## 2020-08-11 RX ADMIN — CEFAZOLIN SODIUM 1 G: 1 INJECTION, SOLUTION INTRAVENOUS at 21:02

## 2020-08-11 RX ADMIN — OXYCODONE HYDROCHLORIDE AND ACETAMINOPHEN 1 TABLET: 10; 325 TABLET ORAL at 22:08

## 2020-08-11 RX ADMIN — CARBIDOPA AND LEVODOPA 2 TABLET: 25; 100 TABLET ORAL at 21:01

## 2020-08-11 RX ADMIN — GABAPENTIN 600 MG: 300 CAPSULE ORAL at 21:43

## 2020-08-11 RX ADMIN — SODIUM CHLORIDE 75 ML/HR: 9 INJECTION, SOLUTION INTRAVENOUS at 00:36

## 2020-08-11 RX ADMIN — CEFAZOLIN SODIUM 1 G: 1 INJECTION, SOLUTION INTRAVENOUS at 14:39

## 2020-08-11 RX ADMIN — ASPRIN AND EXTENDED-RELEASE DIPYRIDAMOLE 1 CAPSULE: 25; 200 CAPSULE ORAL at 14:38

## 2020-08-11 RX ADMIN — Medication 1 APPLICATION: at 21:02

## 2020-08-11 RX ADMIN — GABAPENTIN 600 MG: 300 CAPSULE ORAL at 14:37

## 2020-08-11 RX ADMIN — ASPRIN AND EXTENDED-RELEASE DIPYRIDAMOLE 1 CAPSULE: 25; 200 CAPSULE ORAL at 21:02

## 2020-08-11 RX ADMIN — CARBIDOPA AND LEVODOPA 2 TABLET: 25; 100 TABLET ORAL at 14:38

## 2020-08-11 RX ADMIN — PANTOPRAZOLE SODIUM 40 MG: 40 INJECTION, POWDER, FOR SOLUTION INTRAVENOUS at 06:50

## 2020-08-11 NOTE — PLAN OF CARE
Problem: Patient Care Overview  Goal: Plan of Care Review  Flowsheets (Taken 8/11/2020 6279)  Plan of Care Reviewed With: patient  Outcome Summary: Pt is a 89 yo M who presents to PT with impaired functional mobility and gait secondary to L LE pain, weakness, and decreased ROm post fibular fracture. Pt may benefit from skilled PT to address strength, mobility, and gait.  Note:   Patient was not wearing a face mask during this therapy encounter. Therapist used appropriate personal protective equipment including eye protection, mask, and gloves.  Mask used was standard procedure mask. Appropriate PPE was worn during the entire therapy session. Hand hygiene was completed before and after therapy session. Patient is not in enhanced droplet precautions.

## 2020-08-11 NOTE — CONSULTS
Orthopaedic Surgery  Consult Note  Dr. Hunter Rodney .  [phone]    HPI:  Patient is a 89 y.o.  male who presents with a history of having had a fall injuring his left ankle.    MEDICAL HISTORY  Past Medical History:   Diagnosis Date   • Arthritis    • Chronic pain    • Depression    • Movement disorder     Parkinsons   • Peripheral neuropathy    • Stroke (CMS/[facility])    ·   Past Surgical History:   Procedure Laterality Date   • CARDIAC VALVE REPLACEMENT     ·   Prior to Admission medications    Medication Sig Start Date End Date Taking? Authorizing Provider   aspirin-dipyridamole (AGGRENOX)  MG per 12 hr capsule Take 1 capsule by mouth 2 (Two) Times a Day.   Yes Candice Mcgraw MD   brimonidine-timolol (COMBIGAN) 0.2-0.5 % ophthalmic solution Administer 1 drop to both eyes Every 12 (Twelve) Hours.   Yes Candice Mcgraw MD   carbidopa-levodopa (SINEMET)  MG per tablet Take 2 tablets by mouth 3 (Three) Times a Day.   Yes Candice Mcgraw MD   docusate sodium (COLACE) 100 MG capsule Take 100 mg by mouth Daily.   Yes Candice Mcgraw MD   gabapentin (NEURONTIN) 600 MG tablet Take 2 tablets by mouth 3 (Three) Times a Day. 16  Yes Surya Naranjo MD   oxyCODONE-acetaminophen (PERCOCET)  MG per tablet Take 1 tablet by mouth Every 6 (Six) Hours As Needed for moderate pain (4-6).   Yes Candice Mcgraw MD   rosuvastatin (CRESTOR) 5 MG tablet Take 5 mg by mouth Daily. 10/3/16  Yes Candice Mcgraw MD   ·   Allergies   Allergen Reactions   • Tape  [Adhesive Tape]    ·   ·   · There is no immunization history on file for this patient.  Social History     Tobacco Use   • Smoking status: Former Smoker     Packs/day: 0.25     Years: 5.00     Pack years: 1.25     Types: Cigarettes     Start date:      Last attempt to quit: 1970     Years since quittin.6   • Smokeless tobacco: Never Used   Substance Use Topics   • Alcohol use: Yes     Alcohol/week: 1.0 standard  "drinks     Types: 1 Shots of liquor per week     Comment: 1 drink with dinner daily    ·    Social History     Substance and Sexual Activity   Drug Use No   ·     VITALS: /70 (BP Location: Left arm, Patient Position: Lying)   Pulse 72   Temp 98.2 °F (36.8 °C) (Oral)   Resp 16   Ht 182.9 cm (72\")   Wt 77.1 kg (170 lb)   SpO2 95%   BMI 23.06 kg/m²  Body mass index is 23.06 kg/m².    PHYSICAL EXAM:   · On removal of the splint, left ankle exam shows she has tenderness over the fibula.  He is thin skin with bruising present.  There is no deformity.  Neurovascular exam is intact.    RADIOLOGY REVIEW:   Xr Ankle 3+ View Left    Result Date: 8/10/2020  1. Mildly displaced oblique distal fibular diametaphyseal fracture with soft tissue swelling. 2. No evidence for active disease in the chest.  This report was finalized on 8/10/2020 1:22 PM by Dr. Gray Arboleda M.D.      Xr Foot 3+ View Left    Result Date: 8/10/2020  1. Mildly displaced oblique distal fibular diametaphyseal fracture with soft tissue swelling. 2. No evidence for active disease in the chest.  This report was finalized on 8/10/2020 1:22 PM by Dr. Gray Arboleda M.D.      Ct Head Without Contrast    Result Date: 8/10/2020   No evidence for acute traumatic intracranial pathology.  Radiation dose reduction techniques were utilized, including automated exposure control and exposure modulation based on body size.  This report was finalized on 8/10/2020 3:19 PM by Dr. Samuel Shankar M.D.      Xr Chest 1 View    Result Date: 8/10/2020  1. Mildly displaced oblique distal fibular diametaphyseal fracture with soft tissue swelling. 2. No evidence for active disease in the chest.  This report was finalized on 8/10/2020 1:22 PM by Dr. Gray Arboleda M.D.        LABS:   Results for the past 24 hours:   Recent Results (from the past 24 hour(s))   CBC (No Diff)    Collection Time: 08/11/20  5:20 AM   Result Value Ref Range    WBC 5.22 3.40 - 10.80 10*3/mm3 "    RBC 3.07 (L) 4.14 - 5.80 10*6/mm3    Hemoglobin 10.1 (L) 13.0 - 17.7 g/dL    Hematocrit 29.9 (L) 37.5 - 51.0 %    MCV 97.4 (H) 79.0 - 97.0 fL    MCH 32.9 26.6 - 33.0 pg    MCHC 33.8 31.5 - 35.7 g/dL    RDW 14.4 12.3 - 15.4 %    RDW-SD 50.1 37.0 - 54.0 fl    MPV 9.4 6.0 - 12.0 fL    Platelets 211 140 - 450 10*3/mm3   Basic Metabolic Panel    Collection Time: 08/11/20  5:20 AM   Result Value Ref Range    Glucose 98 65 - 99 mg/dL    BUN 14 8 - 23 mg/dL    Creatinine 0.70 (L) 0.76 - 1.27 mg/dL    Sodium 139 136 - 145 mmol/L    Potassium 4.2 3.5 - 5.2 mmol/L    Chloride 104 98 - 107 mmol/L    CO2 27.1 22.0 - 29.0 mmol/L    Calcium 8.9 8.2 - 9.6 mg/dL    eGFR Non African Amer 105 >60 mL/min/1.73    BUN/Creatinine Ratio 20.0 7.0 - 25.0    Anion Gap 7.9 5.0 - 15.0 mmol/L       IMPRESSION:  Minimally displaced left distal fibula fracture     PLAN:   · This will not require surgery.  · I have removed his splint and put him in a below-knee fracture boot.  We  may need to have therapy adjust this.  · He may weight-bear as tolerated in the boot, but he will need a walker  · Return to office in 2 to 3 weeks for further follow-up x-rays.    Addi Rodney Sr., MD  Orthopaedic Surgery  [facility]

## 2020-08-11 NOTE — NURSING NOTE
08/11/20 1158   Wound 08/10/20 1903 Right cheek Incision   Date first assessed/Time first assessed: 08/10/20 1903   Present on Hospital Admission: Yes  Side: Right  Location: cheek  Primary Wound Type: Incision   Dressing Appearance dressing loose;moist drainage   Closure None   Base moist;pink   Periwound intact   Drainage Characteristics/Odor serous;tan   Drainage Amount small   Care, Wound cleansed with;sterile normal saline   Dressing Care, Wound dressing changed;antimicrobial agent applied;gauze   Periwound Care, Wound dry periwound area maintained   Wound 08/11/20 1130 Bilateral gluteal Pressure Injury   Date first assessed/Time first assessed: 08/11/20 1130   Present on Hospital Admission: Yes  Side: Bilateral  Location: gluteal  Primary Wound Type: Pressure Injury  Stage, Pressure Injury: Stage 2  Additional Comments: shear/ moisture   Dressing Appearance no drainage;open to air   Base clean;pink   Wound Length (cm) 2 cm   Wound Width (cm) 2 cm  (left gluteal 2x2; right gluteal excoriation)   Care, Wound   (will order magic barrier)   CWOCN consult for right cheek wound related to skin cancer removal.  Wound bed moist/ pink with tan serous drainage present.  Patient was unsure of home instructions for wound care. States he is taking an antibiotic. Wound cleaned with saline and silvasorb gel with dry dressing applied.  Patient reports chronic wounds on buttocks for approx 6 months.  He is using desitin and vaseline at home.  Right gluteal with circular partial thickness skin loss, left gluteal excoriation present.  Will request magic barrier to treat.  Left leg with soft cast in place.  Multiple areas of abrasions noted.

## 2020-08-11 NOTE — SIGNIFICANT NOTE
08/11/20 0916   Rehab Time/Intention   Evaluation Not Performed other (see comments)  (Waiting ortho consult. OT to hold.)   Rehab Treatment   Discipline occupational therapist   Recommendation   OT - Next Appointment 08/12/20

## 2020-08-11 NOTE — THERAPY EVALUATION
Patient Name: Anderson Quick  : 10/16/1927    MRN: [id number]                              Today's Date: 2020       Admit Date: 8/10/2020    Visit Dx:     ICD-10-CM ICD-9-CM   1. Generalized weakness R53.1 780.79   2. Closed fracture of distal end of left fibula, unspecified fracture morphology, initial encounter S82.832A 824.8   3. Foot sprain, left, initial encounter S93.602A 845.10   4. Infected surgical wound T81.49XA 998.59   5. Contusion of scalp, initial encounter S00.03XA 920     Patient Active Problem List   Diagnosis   • Generalized weakness     Past Medical History:   Diagnosis Date   • Arthritis    • Chronic pain    • Depression    • Movement disorder     Parkinsons   • Peripheral neuropathy    • Stroke (CMS/HCC)      Past Surgical History:   Procedure Laterality Date   • CARDIAC VALVE REPLACEMENT       General Information     Row Name 20 1653          PT Evaluation Time/Intention    Document Type  evaluation  -     Mode of Treatment  individual therapy;physical therapy  -     Row Name 20 1653          General Information    Prior Level of Function  independent:;gait;transfer;bed mobility  -     Existing Precautions/Restrictions  fall;brace worn when out of bed WBAT with fracture boot per RN  -     Barriers to Rehab  medically complex  -     Row Name 20 1653          Relationship/Environment    Lives With  spouse  -     Row Name 20 1653          Resource/Environmental Concerns    Current Living Arrangements  home/apartment/condo  -     Row Name 20 1653          Cognitive Assessment/Intervention- PT/OT    Orientation Status (Cognition)  oriented x 3  -     Row Name 20 1653          Safety Issues, Functional Mobility    Impairments Affecting Function (Mobility)  balance;endurance/activity tolerance;pain;range of motion (ROM);strength  -       User Key  (r) = Recorded By, (t) = Taken By, (c) = Cosigned By    Initials Name Provider Type      Franny Simon, PT Physical Therapist        Mobility     Row Name 08/11/20 1653          Bed Mobility Assessment/Treatment    Bed Mobility Assessment/Treatment  supine-sit;sit-supine  -     Supine-Sit S[place]y (Bed Mobility)  verbal cues;nonverbal cues (demo/gesture);moderate assist (50% patient effort)  -     Sit-Supine [place] (Bed Mobility)  verbal cues;nonverbal cues (demo/gesture);moderate assist (50% patient effort)  -     Row Name 08/11/20 1653          Sit-Stand Transfer    Sit-Stand [place] (Transfers)  verbal cues;nonverbal cues (demo/gesture);moderate assist (50% patient effort)  -     Assistive Device (Sit-Stand Transfers)  walker, front-wheeled  -     Row Name 08/11/20 1653          Gait/Stairs Assessment/Training    Sibley Level (Gait)  verbal cues;nonverbal cues (demo/gesture);moderate assist (50% patient effort)  -     Assistive Device (Gait)  walker, front-wheeled  -     Distance in Feet (Gait)  3 side steps to HOB  -     Deviations/Abnormal Patterns (Gait)  eliel decreased;gait speed decreased;stride length decreased  -     Bilateral Gait Deviations  forward flexed posture  -     Comment (Gait/Stairs)  pt maintained PWB on L LE due to pain, pt with splint on L ankle, unable to don fracture boot over splint  -       User Key  (r) = Recorded By, (t) = Taken By, (c) = Cosigned By    Initials Name Provider Type     Franny Simon, PT Physical Therapist        Obj/Interventions     [facility] Name 08/11/20 1655          General ROM    GENERAL ROM COMMENTS  L ankle ROM limited secondary to splint  -[facility] Name 08/11/20 1655          MMT (Manual Muscle Testing)    General MMT Comments  B LE generalized weakness noted with functional mobility  -[facility] Name 08/11/20 1655          Static Standing Balance    Level of S[place]y (Supported Standing, Static Balance)  minimal assist, 75% patient effort  -     Assistive Device Utilized (Supported  Standing, Static Balance)  walker, rolling  -CH     Row Name 08/11/20 1655          Dynamic Standing Balance    Level of Wells, Reaches Outside Midline (Standing, Dynamic Balance)  moderate assist, 50 to 74% patient effort  -     Assistive Device Utilized (Supported Standing, Dynamic Balance)  walker, rolling  -CH       User Key  (r) = Recorded By, (t) = Taken By, (c) = Cosigned By    Initials Name Provider Type     Franny Simon, PT Physical Therapist        Goals/Plan     Row Name 08/11/20 1657          Bed Mobility Goal 1 (PT)    Activity/Assistive Device (Bed Mobility Goal 1, PT)  bed mobility activities, all  -CH     Wells Level/Cues Needed (Bed Mobility Goal 1, PT)  supervision required  -CH     Time Frame (Bed Mobility Goal 1, PT)  1 week  -[facility] Name 08/11/20 1657          Transfer Goal 1 (PT)    Activity/Assistive Device (Transfer Goal 1, PT)  transfers, all;walker, rolling  -CH     Wells Level/Cues Needed (Transfer Goal 1, PT)  supervision required  -CH     Time Frame (Transfer Goal 1, PT)  1 week  -[facility] Name 08/11/20 1657          Gait Training Goal 1 (PT)    Activity/Assistive Device (Gait Training Goal 1, PT)  gait (walking locomotion);walker, rolling  -CH     Wells Level (Gait Training Goal 1, PT)  supervision required  -CH     Distance (Gait Goal 1, PT)  100  -CH     Time Frame (Gait Training Goal 1, PT)  1 week  -       User Key  (r) = Recorded By, (t) = Taken By, (c) = Cosigned By    Initials Name Provider Type     Franny Simon, PT Physical Therapist        Clinical Impression     Row Name 08/11/20 1656          Pain Assessment    Additional Documentation  Pain Scale: Numbers Pre/Post-Treatment (Group)  -[facility] Name 08/11/20 1656          Pain Scale: Numbers Pre/Post-Treatment    Pain Scale: Numbers, Pretreatment  5/10  -CH     Pain Scale: Numbers, Post-Treatment  7/10  -CH     Pain Location - Side  Left  -     Pain Location  ankle  -      Pain Intervention(s)  Repositioned  -     Row Name 08/11/20 1656          Plan of Care Review    Plan of Care Reviewed With  patient  -     Outcome Summary  Pt is a 89 yo M who presents to PT with impaired functional mobility and gait secondary to L LE pain, weakness, and decreased ROm post fibular fracture. Pt may benefit from skilled PT to address strength, mobility, and gait.  -     Row Name 08/11/20 1656          Physical Therapy Clinical Impression    Patient/Family Goals Statement (PT Clinical Impression)  to return to [facility]  -     Criteria for Skilled Interventions Met (PT Clinical Impression)  treatment indicated  -     Rehab Potential (PT Clinical Summary)  good, to achieve stated therapy goals  -     Row Name 08/11/20 1656          Positioning and Restraints    Pre-Treatment Position  in bed  -     Post Treatment Position  bed  -     In Bed  supine;call light within reach;encouraged to call for assist;exit alarm on  -       User Key  (r) = Recorded By, (t) = Taken By, (c) = Cosigned By    Initials Name Provider Type    Franny Carnes S, PT Physical Therapist        Outcome Measures     Row Name 08/11/20 1658          How much help from another person do you currently need...    Turning from your back to your side while in flat bed without using bedrails?  2  -CH     Moving from lying on back to sitting on the side of a flat bed without bedrails?  2  -CH     Moving to and from a bed to a chair (including a wheelchair)?  2  -CH     Standing up from a chair using your arms (e.g., wheelchair, bedside chair)?  2  -CH     Climbing 3-5 steps with a railing?  1  -CH     To walk in hospital room?  1  -CH     AM-PAC 6 Clicks Score (PT)  10  -     Row Name 08/11/20 1658          Functional Assessment    Outcome Measure Options  AM-PAC 6 Clicks Basic Mobility (PT)  -       User Key  (r) = Recorded By, (t) = Taken By, (c) = Cosigned By    Initials Name Provider Type    Franny Carnes  S, PT Physical Therapist        Physical Therapy Education                 Title: PT OT SLP Therapies (Done)     Topic: Physical Therapy (Done)     Point: Mobility training (Done)     Description:   Instruct learner(s) on safety and technique for assisting patient out of bed, chair or wheelchair.  Instruct in the proper use of assistive devices, such as walker, crutches, cane or brace.              Patient Friendly Description:   It's important to get you on your feet again, but we need to do so in a way that is safe for you. Falling has serious consequences, and your personal safety is the most important thing of all.        When it's time to get out of bed, one of us or a family member will sit next to you on the bed to give you support.     If your doctor or nurse tells you to use a walker, crutches, a cane, or a brace, be sure you use it every time you get out of bed, even if you think you don't need it.    Learning Progress Summary           Patient Acceptance, E,TB,D, VU,NR by  at 8/11/2020 1658                   Point: Home exercise program (Done)     Description:   Instruct learner(s) on appropriate technique for monitoring, assisting and/or progressing patient with therapeutic exercises and activities.              Learning Progress Summary           Patient Acceptance, E,TB,D, VU,NR by  at 8/11/2020 1658                   Point: Body mechanics (Done)     Description:   Instruct learner(s) on proper positioning and spine alignment for patient and/or caregiver during mobility tasks and/or exercises.              Learning Progress Summary           Patient Acceptance, E,TB,D, VU,NR by  at 8/11/2020 1658                   Point: Precautions (Done)     Description:   Instruct learner(s) on prescribed precautions during mobility and gait tasks              Learning Progress Summary           Patient Acceptance, E,TB,D, VU,NR by  at 8/11/2020 1658                               User Key     Initials  Effective Dates Name Provider Type Discipline     04/03/18 -  Franny Simon PT Physical Therapist PT              PT Recommendation and Plan  Planned Therapy Interventions (PT Eval): balance training, bed mobility training, gait training, home exercise program, patient/family education, strengthening, transfer training  Outcome Summary/Treatment Plan (PT)  Anticipated Discharge Disposition (PT): skilled nursing facility  Plan of Care Reviewed With: patient  Outcome Summary: Pt is a 89 yo M who presents to PT with impaired functional mobility and gait secondary to L LE pain, weakness, and decreased ROm post fibular fracture. Pt may benefit from skilled PT to address strength, mobility, and gait.     Time Calculation:   PT Charges     Row Name 08/11/20 1659             Time Calculation    Start Time  1530  -      Stop Time  1543  -      Time Calculation (min)  13 min  -      PT Received On  08/11/20  -      PT - Next Appointment  08/12/20  -      PT Goal Re-Cert Due Date  08/18/20  -         Time Calculation- PT    Total Timed Code Minutes- PT  8 minute(s)  -        User Key  (r) = Recorded By, (t) = Taken By, (c) = Cosigned By    Initials Name Provider Type     Franny Simon PT Physical Therapist        Therapy Charges for Today     Code Description Service Date Service Provider Modifiers Qty    91339247041 HC PT EVAL MOD COMPLEXITY 2 8/11/2020 Franny Simon, PT GP 1    82292352173 HC PT THER PROC EA 15 MIN 8/11/2020 Franny Simon, PT GP 1          PT G-Codes  Outcome Measure Options: AM-PAC 6 Clicks Basic Mobility (PT)  AM-PAC 6 Clicks Score (PT): 10    Franny Simon PT  8/11/2020

## 2020-08-11 NOTE — PLAN OF CARE
Pt alert and oriented x4. Pt reported pain of 7 to left lower extremity once. Pain medications adminsitered as ordered. Left lower extremity elevated on pillows. No increased warmth, tightness or increased pain this shift. Pt reported pain medications effective. Vital signs within normal limits for patient. Resp even and unlabored.

## 2020-08-11 NOTE — H&P
History and physical    Primary care physician  Dr. David Ortiz    Chief complaint  Status post fall x2  Generalized weakness  Left ankle foot pain    History of present illness  89-year-old white male with history of Parkinson's disease stroke neuropathy movement disorder depression osteoarthritis hyperlipidemia who lives at home with his wife brought to the emergency room with multiple falls generalized weakness left ankle foot pain.  Patient has injured the foot few days ago.  Patient fully alert oriented complaining of pain at the injury site but no other complaint.  Patient evaluated in ER found to have possible infected surgical wound as he has recent surgery on right cheek with some drainage but no fever chills.  Patient also have distal left fibula fracture and left foot sprain.  Patient has multiple bruising and contusion of the scalp on evaluation.  Patient admitted for management    PAST MEDICAL HISTORY  • Arthritis     • Chronic pain     • Depression     • Movement disorder     • Peripheral neuropathy     • Stroke (CMS/[facility])        PAST SURGICAL HISTORY  Surgical History         Past Surgical History:   Procedure Laterality Date   • CARDIAC VALVE REPLACEMENT           FAMILY HISTORY        Family History   Problem Relation Age of Onset   • Stroke Father     • Parkinsonism Paternal Aunt        SOCIAL HISTORY  Social History   Social History            Socioeconomic History   • Marital status:        Spouse name: Not on file   • Number of children: Not on file   • Years of education: Not on file   • Highest education level: Not on file   Tobacco Use   • Smoking status: Former Smoker       Packs/day: 0.25       Years: 5.00       Pack years: 1.25       Types: Cigarettes       Start date:        Last attempt to quit: 1970       Years since quittin.6   • Smokeless tobacco: Never Used   Substance and Sexual Activity   • Alcohol use: Yes       Alcohol/week: 1.0 standard drinks       Types:  "1 Shots of liquor per week       Comment: 1 drink with dinner daily    • Drug use: No         ALLERGIES  Tape  [adhesive tape]  Home medications reviewed     REVIEW OF SYSTEMS  All systems reviewed and negative except for those discussed in HPI.      PHYSICAL EXAM  Blood pressure 123/71, pulse 72, temperature 98.6 °F (37 °C), temperature source Oral, resp. rate 16, height 182.9 cm (72\"), weight 77.1 kg (170 lb), SpO2 95 %.    GENERAL: Not distressed  HENT: Mild bruising  EYES: no scleral icterus, PERRLA, extraocular movements intact  CV: regular rhythm, regular rate  RESPIRATORY: normal effort CTA B  ABDOMEN: soft, nontender, nondistended  MUSCULOSKELETAL: Significant amount of ecchymosis and 2+ pitting edema to the left foot and ankle.  DP and PT pulses are 1+, sensation is intact distally and cap refill is less than 2 seconds.  Limited range of motion of the left ankle due to pain.  There is no proximal tib-fib tenderness or metatarsal tenderness.  NEURO: alert, moves all extremities, follows commands  SKIN: warm, dry     LAB RESULTS  Lab Results (last 24 hours)     Procedure Component Value Units Date/Time    Basic Metabolic Panel [[id number]]  (Abnormal) Collected:  08/11/20 0520    Specimen:  Blood Updated:  08/11/20 0609     Glucose 98 mg/dL      BUN 14 mg/dL      Creatinine 0.70 mg/dL      Sodium 139 mmol/L      Potassium 4.2 mmol/L      Chloride 104 mmol/L      CO2 27.1 mmol/L      Calcium 8.9 mg/dL      eGFR Non African Amer 105 mL/min/1.73      BUN/Creatinine Ratio 20.0     Anion Gap 7.9 mmol/L     Narrative:       GFR Normal >60  Chronic Kidney Disease <60  Kidney Failure <15      CBC (No Diff) [[id number]]  (Abnormal) Collected:  08/11/20 0520    Specimen:  Blood Updated:  08/11/20 0542     WBC 5.22 10*3/mm3      RBC 3.07 10*6/mm3      Hemoglobin 10.1 g/dL      Hematocrit 29.9 %      MCV 97.4 fL      MCH 32.9 pg      MCHC 33.8 g/dL      RDW 14.4 %      RDW-SD 50.1 fl      MPV 9.4 fL      Platelets 211 " 10*3/mm3     COVID PRE-OP / PRE-PROCEDURE SCREENING ORDER (NO ISOLATION) - Swab, Nasopharynx [[id number]] Collected:  08/10/20 1604    Specimen:  Swab from Nasopharynx Updated:  08/10/20 1622    Narrative:       The following orders were created for panel order COVID PRE-OP / PRE-PROCEDURE SCREENING ORDER (NO ISOLATION) - Swab, Nasopharynx.  Procedure                               Abnormality         Status                     ---------                               -----------         ------                     COVID-19,BIOTAP, NP/OP S...[[id number]]                      In process                   Please view results for these tests on the individual orders.    COVID-19,BIOTAP, NP/OP SWAB IN TRANSPORT MEDIA OR SALINE 24-36 HR TAT - Swab, Nasopharynx [[id number]] Collected:  08/10/20 1604    Specimen:  Swab from Nasopharynx Updated:  08/10/20 1622    Urinalysis, Microscopic Only - Urine, Clean Catch [[id number]]  (Abnormal) Collected:  08/10/20 1434    Specimen:  Urine, Clean Catch Updated:  08/10/20 1513     RBC, UA 6-12 /HPF      WBC, UA 6-12 /HPF      Bacteria, UA None Seen /HPF      Squamous Epithelial Cells, UA 0-2 /HPF      Hyaline Casts, UA 3-6 /LPF      Amorphous Crystals, UA Large/3+ /HPF      Methodology Manual Light Microscopy    Urinalysis With Microscopic If Indicated (No Culture) - Urine, Clean Catch [[id number]]  (Abnormal) Collected:  08/10/20 1434    Specimen:  Urine, Clean Catch Updated:  08/10/20 1500     Color, UA Dark Yellow     Appearance, UA Turbid     pH, UA 8.5     Specific Gravity, UA 1.024     Glucose, UA Negative     Ketones, UA Trace     Bilirubin, UA Negative     Blood, UA Large (3+)     Protein, UA 30 mg/dL (1+)     Leuk Esterase, UA Trace     Nitrite, UA Negative     Urobilinogen, UA 0.2 E.U./dL    Comprehensive Metabolic Panel [[id number]] Collected:  08/10/20 1152    Specimen:  Blood Updated:  08/10/20 1232     Glucose 95 mg/dL      BUN 15 mg/dL      Creatinine 0.81 mg/dL      Sodium  141 mmol/L      Potassium 4.2 mmol/L      Chloride 100 mmol/L      CO2 28.7 mmol/L      Calcium 8.9 mg/dL      Total Protein 6.9 g/dL      Albumin 3.50 g/dL      ALT (SGPT) <5 U/L      AST (SGOT) 11 U/L      Alkaline Phosphatase 47 U/L      Total Bilirubin 0.4 mg/dL      eGFR Non African Amer 89 mL/min/1.73      Globulin 3.4 gm/dL      A/G Ratio 1.0 g/dL      BUN/Creatinine Ratio 18.5     Anion Gap 12.3 mmol/L     Narrative:       GFR Normal >60  Chronic Kidney Disease <60  Kidney Failure <15      Troponin [[id number]]  (Normal) Collected:  08/10/20 1152    Specimen:  Blood Updated:  08/10/20 1232     Troponin T <0.010 ng/mL     Narrative:       Troponin T Reference Range:  <= 0.03 ng/mL-   Negative for AMI  >0.03 ng/mL-     Abnormal for myocardial necrosis.  Clinicians would have to utilize clinical acumen, EKG, Troponin and serial changes to determine if it is an Acute Myocardial Infarction or myocardial injury due to an underlying chronic condition.       Results may be falsely decreased if patient taking Biotin.      Protime-INR [[id number]]  (Normal) Collected:  08/10/20 1152    Specimen:  Blood Updated:  08/10/20 1226     Protime 14.1 Seconds      INR 1.10    CBC & Differential [[id number]] Collected:  08/10/20 1152    Specimen:  Blood Updated:  08/10/20 1210    Narrative:       The following orders were created for panel order CBC & Differential.  Procedure                               Abnormality         Status                     ---------                               -----------         ------                     CBC Auto Differential[[id number]]        Abnormal            Final result                 Please view results for these tests on the individual orders.    CBC Auto Differential [[id number]]  (Abnormal) Collected:  08/10/20 1152    Specimen:  Blood Updated:  08/10/20 1210     WBC 7.64 10*3/mm3      RBC 3.56 10*6/mm3      Hemoglobin 11.6 g/dL      Hematocrit 35.1 %      MCV 98.6 fL      MCH 32.6 pg       MCHC 33.0 g/dL      RDW 14.5 %      RDW-SD 52.0 fl      MPV 10.1 fL      Platelets 239 10*3/mm3      Neutrophil % 75.0 %      Lymphocyte % 12.2 %      Monocyte % 8.2 %      Eosinophil % 3.4 %      Basophil % 0.7 %      Immature Grans % 0.5 %      Neutrophils, Absolute 5.73 10*3/mm3      Lymphocytes, Absolute 0.93 10*3/mm3      Monocytes, Absolute 0.63 10*3/mm3      Eosinophils, Absolute 0.26 10*3/mm3      Basophils, Absolute 0.05 10*3/mm3      Immature Grans, Absolute 0.04 10*3/mm3      nRBC 0.1 /100 WBC         Imaging Results (Last 24 Hours)     Procedure Component Value Units Date/Time    CT Head Without Contrast [[id number]] Collected:  08/10/20 1518     Updated:  08/10/20 1522    Narrative:       CT HEAD WITHOUT CONTRAST     CLINICAL HISTORY: Right frontotemporal head injury. On Aggrenox.     CT scan head was obtained with 2 mm axial bone algorithm and 3 mm axial  soft tissue algorithm images. No intravenous contrast was administered.     FINDINGS:     There is no evidence for a calvarial fracture. There is no evidence for  an acute extra-axial hemorrhage. The ventricles, sulci, and cisterns are  age appropriate. Moderate changes of chronic small vessel ischemic  phenomena are noted. The gray-white matter differentiation is within  normal limits. The basal ganglia and thalami are unremarkable in  appearance. The posterior fossa structures are within normal limits.       Impression:          No evidence for acute traumatic intracranial pathology.     Radiation dose reduction techniques were utilized, including automated  exposure control and exposure modulation based on body size.     This report was finalized on 8/10/2020 3:19 PM by Dr. Samuel Shankar M.D.       XR Chest 1 View [[id number]] Collected:  08/10/20 1320     Updated:  08/10/20 1325    Narrative:       LEFT ANKLE, LEFT FOOT, CHEST     HISTORY: Left foot pain after falling.     COMPARISON: None.     FINDINGS:  AP PORTABLE UPRIGHT CHEST:  Cardiomediastinal silhouette is within normal  limits.  There are median sternotomy wires and aortic atherosclerotic  calcifications are present. There are chronic-appearing increased  interstitial markings though the lungs appear clear of focal airspace  disease and there is no evidence for pneumothorax. Bones appear  osteopenic.     LEFT ANKLE: 3 VIEWS: There is an oblique distal fibular diametaphyseal  fracture. The fracture begins at the lateral fibular cortex 7.7 cm above  the fibular tip and extends obliquely distally into the distal  tibiofibular articulation with 4 mm lateral displacement. There is  overlying soft tissue swelling. Vascular calcifications are noted.     LEFT FOOT: 3 VIEWS:  Midfoot alignment is within normal limits. There  are chronic degenerative changes at the 1st MTP joint. No fracture or  acute osseous abnormality is evident. There are chronic degenerative  changes at the 1st MTP joint.       Impression:       1. Mildly displaced oblique distal fibular diametaphyseal fracture with  soft tissue swelling.  2. No evidence for active disease in the chest.     This report was finalized on 8/10/2020 1:22 PM by Dr. Gray Arboleda M.D.       XR Foot 3+ View Left [[id number]] Collected:  08/10/20 1320     Updated:  08/10/20 1325    Narrative:       LEFT ANKLE, LEFT FOOT, CHEST     HISTORY: Left foot pain after falling.     COMPARISON: None.     FINDINGS:  AP PORTABLE UPRIGHT CHEST: Cardiomediastinal silhouette is within normal  limits.  There are median sternotomy wires and aortic atherosclerotic  calcifications are present. There are chronic-appearing increased  interstitial markings though the lungs appear clear of focal airspace  disease and there is no evidence for pneumothorax. Bones appear  osteopenic.     LEFT ANKLE: 3 VIEWS: There is an oblique distal fibular diametaphyseal  fracture. The fracture begins at the lateral fibular cortex 7.7 cm above  the fibular tip and extends obliquely  distally into the distal  tibiofibular articulation with 4 mm lateral displacement. There is  overlying soft tissue swelling. Vascular calcifications are noted.     LEFT FOOT: 3 VIEWS:  Midfoot alignment is within normal limits. There  are chronic degenerative changes at the 1st MTP joint. No fracture or  acute osseous abnormality is evident. There are chronic degenerative  changes at the 1st MTP joint.       Impression:       1. Mildly displaced oblique distal fibular diametaphyseal fracture with  soft tissue swelling.  2. No evidence for active disease in the chest.     This report was finalized on 8/10/2020 1:22 PM by Dr. Gray Arboleda M.D.       XR Ankle 3+ View Left [[id number]] Collected:  08/10/20 1320     Updated:  08/10/20 1325    Narrative:       LEFT ANKLE, LEFT FOOT, CHEST     HISTORY: Left foot pain after falling.     COMPARISON: None.     FINDINGS:  AP PORTABLE UPRIGHT CHEST: Cardiomediastinal silhouette is within normal  limits.  There are median sternotomy wires and aortic atherosclerotic  calcifications are present. There are chronic-appearing increased  interstitial markings though the lungs appear clear of focal airspace  disease and there is no evidence for pneumothorax. Bones appear  osteopenic.     LEFT ANKLE: 3 VIEWS: There is an oblique distal fibular diametaphyseal  fracture. The fracture begins at the lateral fibular cortex 7.7 cm above  the fibular tip and extends obliquely distally into the distal  tibiofibular articulation with 4 mm lateral displacement. There is  overlying soft tissue swelling. Vascular calcifications are noted.     LEFT FOOT: 3 VIEWS:  Midfoot alignment is within normal limits. There  are chronic degenerative changes at the 1st MTP joint. No fracture or  acute osseous abnormality is evident. There are chronic degenerative  changes at the 1st MTP joint.       Impression:       1. Mildly displaced oblique distal fibular diametaphyseal fracture with  soft tissue  swelling.  2. No evidence for active disease in the chest.     This report was finalized on 8/10/2020 1:22 PM by Dr. Gray Arboleda M.D.           ECG 12 Lead        HEART RATE= 71  bpm  RR Interval= 876  ms  MT Interval= 177  ms  P Horizontal Axis=   deg  P Front Axis= -16  deg  QRSD Interval= 86  ms  QT Interval= 416  ms  QRS Axis= -15  deg  T Wave Axis= 24  deg  - OTHERWISE NORMAL ECG -  Sinus rhythm  Ventricular premature complex  Borderline left axis deviation  NO PRIOR TRACING AVAILABLE FOR COMPARISON             Current Facility-Administered Medications:   •  aspirin-dipyridamole (AGGRENOX)  MG per 12 hr capsule 1 capsule, 1 capsule, Oral, BID, Samuel Arroyo MD  •  brimonidine-timolol (COMBIGAN) 0.2-0.5 % ophthalmic solution 1 drop, 1 drop, Both Eyes, Q12H, Samuel Arroyo MD  •  carbidopa-levodopa (SINEMET)  MG per tablet 2 tablet, 2 tablet, Oral, TID, Samuel Arroyo MD  •  docusate sodium (COLACE) capsule 100 mg, 100 mg, Oral, Daily, Samuel Arroyo MD  •  gabapentin (NEURONTIN) capsule 600 mg, 600 mg, Oral, Q8H, Samuel Arroyo MD  •  oxyCODONE-acetaminophen (PERCOCET)  MG per tablet 1 tablet, 1 tablet, Oral, Q6H PRN, Samuel Arroyo MD, 1 tablet at 08/10/20 2224  •  pantoprazole (PROTONIX) EC tablet 40 mg, 40 mg, Oral, Q AM, Samuel Arroyo MD  •  rosuvastatin (CRESTOR) tablet 5 mg, 5 mg, Oral, Daily, Samuel Arroyo MD  •  sodium chloride 0.9 % infusion, 75 mL/hr, Intravenous, Continuous, Samuel Arroyo MD, Last Rate: 75 mL/hr at 08/11/20 1023, 75 mL/hr at 08/11/20 1023     ASSESSMENT  Left distal fibula fracture  Left foot sprain  Status post multiple falls  Right cheek wound infection  Parkinson disease  Movement disorder  Status post CVA in the past  Hyperlipidemia  Neuropathy  Osteoarthritis  Gastroesophageal reflux disease    PLAN  Admit  IV antibiotics  Pain management  Orthopedic surgery consult  Adjust home medications  Stress ulcer DVT prophylaxis  PT OT  Supportive care  Patient is full  code  Discussed with nursing staff  Follow closely further recommendation current hospital course    ELVIA MCCLAIN MD

## 2020-08-12 LAB
ALBUMIN SERPL-MCNC: 3.3 G/DL (ref 3.5–5.2)
ALBUMIN/GLOB SERPL: 1 G/DL
ALP SERPL-CCNC: 43 U/L (ref 39–117)
ALT SERPL W P-5'-P-CCNC: <5 U/L (ref 1–41)
ANION GAP SERPL CALCULATED.3IONS-SCNC: 5.8 MMOL/L (ref 5–15)
AST SERPL-CCNC: 12 U/L (ref 1–40)
BILIRUB SERPL-MCNC: 0.6 MG/DL (ref 0–1.2)
BUN SERPL-MCNC: 12 MG/DL (ref 8–23)
BUN/CREAT SERPL: 16.4 (ref 7–25)
CALCIUM SPEC-SCNC: 9.1 MG/DL (ref 8.2–9.6)
CHLORIDE SERPL-SCNC: 105 MMOL/L (ref 98–107)
CHOLEST SERPL-MCNC: 117 MG/DL (ref 0–200)
CO2 SERPL-SCNC: 27.2 MMOL/L (ref 22–29)
CREAT SERPL-MCNC: 0.73 MG/DL (ref 0.76–1.27)
DEPRECATED RDW RBC AUTO: 50.3 FL (ref 37–54)
ERYTHROCYTE [DISTWIDTH] IN BLOOD BY AUTOMATED COUNT: 14.4 % (ref 12.3–15.4)
GFR SERPL CREATININE-BSD FRML MDRD: 100 ML/MIN/1.73
GLOBULIN UR ELPH-MCNC: 3.4 GM/DL
GLUCOSE SERPL-MCNC: 95 MG/DL (ref 65–99)
HBA1C MFR BLD: 5.3 % (ref 4.8–5.6)
HCT VFR BLD AUTO: 29.8 % (ref 37.5–51)
HDLC SERPL-MCNC: 48 MG/DL (ref 40–60)
HGB BLD-MCNC: 10.2 G/DL (ref 13–17.7)
LDLC SERPL CALC-MCNC: 56 MG/DL (ref 0–100)
LDLC/HDLC SERPL: 1.18 {RATIO}
MCH RBC QN AUTO: 32.8 PG (ref 26.6–33)
MCHC RBC AUTO-ENTMCNC: 34.2 G/DL (ref 31.5–35.7)
MCV RBC AUTO: 95.8 FL (ref 79–97)
NT-PROBNP SERPL-MCNC: 1271 PG/ML (ref 0–1800)
PLATELET # BLD AUTO: 232 10*3/MM3 (ref 140–450)
PMV BLD AUTO: 9.6 FL (ref 6–12)
POTASSIUM SERPL-SCNC: 4.3 MMOL/L (ref 3.5–5.2)
PROT SERPL-MCNC: 6.7 G/DL (ref 6–8.5)
RBC # BLD AUTO: 3.11 10*6/MM3 (ref 4.14–5.8)
REF LAB TEST METHOD: NORMAL
SARS-COV-2 RNA RESP QL NAA+PROBE: NOT DETECTED
SODIUM SERPL-SCNC: 138 MMOL/L (ref 136–145)
TRIGL SERPL-MCNC: 63 MG/DL (ref 0–150)
TSH SERPL DL<=0.05 MIU/L-ACNC: 1.7 UIU/ML (ref 0.27–4.2)
VLDLC SERPL-MCNC: 12.6 MG/DL (ref 5–40)
WBC # BLD AUTO: 4.96 10*3/MM3 (ref 3.4–10.8)

## 2020-08-12 PROCEDURE — 97110 THERAPEUTIC EXERCISES: CPT

## 2020-08-12 PROCEDURE — 85027 COMPLETE CBC AUTOMATED: CPT | Performed by: HOSPITALIST

## 2020-08-12 PROCEDURE — 25010000003 CEFAZOLIN 1-4 GM/50ML-% SOLUTION: Performed by: HOSPITALIST

## 2020-08-12 PROCEDURE — G0378 HOSPITAL OBSERVATION PER HR: HCPCS

## 2020-08-12 PROCEDURE — 84443 ASSAY THYROID STIM HORMONE: CPT | Performed by: HOSPITALIST

## 2020-08-12 PROCEDURE — 83880 ASSAY OF NATRIURETIC PEPTIDE: CPT | Performed by: HOSPITALIST

## 2020-08-12 PROCEDURE — 80053 COMPREHEN METABOLIC PANEL: CPT | Performed by: HOSPITALIST

## 2020-08-12 PROCEDURE — 83036 HEMOGLOBIN GLYCOSYLATED A1C: CPT | Performed by: HOSPITALIST

## 2020-08-12 PROCEDURE — 96366 THER/PROPH/DIAG IV INF ADDON: CPT

## 2020-08-12 PROCEDURE — 80061 LIPID PANEL: CPT | Performed by: HOSPITALIST

## 2020-08-12 PROCEDURE — 97116 GAIT TRAINING THERAPY: CPT

## 2020-08-12 PROCEDURE — 96365 THER/PROPH/DIAG IV INF INIT: CPT

## 2020-08-12 RX ADMIN — GABAPENTIN 600 MG: 300 CAPSULE ORAL at 06:03

## 2020-08-12 RX ADMIN — Medication 1 APPLICATION: at 09:06

## 2020-08-12 RX ADMIN — OXYCODONE HYDROCHLORIDE AND ACETAMINOPHEN 1 TABLET: 10; 325 TABLET ORAL at 15:39

## 2020-08-12 RX ADMIN — ASPRIN AND EXTENDED-RELEASE DIPYRIDAMOLE 1 CAPSULE: 25; 200 CAPSULE ORAL at 09:06

## 2020-08-12 RX ADMIN — GABAPENTIN 600 MG: 300 CAPSULE ORAL at 13:46

## 2020-08-12 RX ADMIN — BRIMONIDINE TARTRATE, TIMOLOL MALEATE 1 DROP: 2; 5 SOLUTION/ DROPS OPHTHALMIC at 06:03

## 2020-08-12 RX ADMIN — ROSUVASTATIN CALCIUM 5 MG: 5 TABLET, FILM COATED ORAL at 09:06

## 2020-08-12 RX ADMIN — OXYCODONE HYDROCHLORIDE AND ACETAMINOPHEN 1 TABLET: 10; 325 TABLET ORAL at 21:00

## 2020-08-12 RX ADMIN — DOCUSATE SODIUM 100 MG: 100 CAPSULE, LIQUID FILLED ORAL at 09:06

## 2020-08-12 RX ADMIN — CARBIDOPA AND LEVODOPA 2 TABLET: 25; 100 TABLET ORAL at 09:06

## 2020-08-12 RX ADMIN — GABAPENTIN 600 MG: 300 CAPSULE ORAL at 20:56

## 2020-08-12 RX ADMIN — PANTOPRAZOLE SODIUM 40 MG: 40 TABLET, DELAYED RELEASE ORAL at 06:03

## 2020-08-12 RX ADMIN — BRIMONIDINE TARTRATE, TIMOLOL MALEATE 1 DROP: 2; 5 SOLUTION/ DROPS OPHTHALMIC at 12:02

## 2020-08-12 RX ADMIN — ASPRIN AND EXTENDED-RELEASE DIPYRIDAMOLE 1 CAPSULE: 25; 200 CAPSULE ORAL at 20:56

## 2020-08-12 RX ADMIN — Medication 1 APPLICATION: at 20:57

## 2020-08-12 RX ADMIN — CEFAZOLIN SODIUM 1 G: 1 INJECTION, SOLUTION INTRAVENOUS at 04:42

## 2020-08-12 RX ADMIN — CEFAZOLIN SODIUM 1 G: 1 INJECTION, SOLUTION INTRAVENOUS at 12:02

## 2020-08-12 RX ADMIN — CARBIDOPA AND LEVODOPA 2 TABLET: 25; 100 TABLET ORAL at 15:39

## 2020-08-12 RX ADMIN — SODIUM CHLORIDE 50 ML/HR: 9 INJECTION, SOLUTION INTRAVENOUS at 06:05

## 2020-08-12 RX ADMIN — CARBIDOPA AND LEVODOPA 2 TABLET: 25; 100 TABLET ORAL at 20:56

## 2020-08-12 RX ADMIN — CEFAZOLIN SODIUM 1 G: 1 INJECTION, SOLUTION INTRAVENOUS at 20:56

## 2020-08-12 RX ADMIN — OXYCODONE HYDROCHLORIDE AND ACETAMINOPHEN 1 TABLET: 10; 325 TABLET ORAL at 09:20

## 2020-08-12 NOTE — THERAPY TREATMENT NOTE
Patient Name: Anderson Quick  : 10/16/1927    MRN: [id number]                              Today's Date: 2020       Admit Date: 8/10/2020    Visit Dx:     ICD-10-CM ICD-9-CM   1. Generalized weakness R53.1 780.79   2. Closed fracture of distal end of left fibula, unspecified fracture morphology, initial encounter S82.832A 824.8   3. Foot sprain, left, initial encounter S93.602A 845.10   4. Infected surgical wound T81.49XA 998.59   5. Contusion of scalp, initial encounter S00.03XA 920     Patient Active Problem List   Diagnosis   • Generalized weakness     Past Medical History:   Diagnosis Date   • Arthritis    • Chronic pain    • Depression    • Movement disorder     Parkinsons   • Peripheral neuropathy    • Stroke (CMS/HCC)      Past Surgical History:   Procedure Laterality Date   • CARDIAC VALVE REPLACEMENT       General Information     Row Name 20 1440          PT Evaluation Time/Intention    Document Type  therapy note (daily note)  -     Mode of Treatment  physical therapy  -[facility] Name 20 1440          General Information    Existing Precautions/Restrictions  fall;partial weight bearing PWB L LE w/ boot on  -     Row Name 20 1440          Cognitive Assessment/Intervention- PT/OT    Orientation Status (Cognition)  oriented x 3  -       User Key  (r) = Recorded By, (t) = Taken By, (c) = Cosigned By    Initials Name Provider Type     Sonia Black PTA Physical Therapy Assistant        Mobility     Row Name 20 1440          Bed Mobility Assessment/Treatment    Bed Mobility Assessment/Treatment  supine-sit;sit-supine  -     Supine-Sit [place] (Bed Mobility)  minimum assist (75% patient effort)  -     Sit-Supine [place] (Bed Mobility)  supervision  -     Assistive Device (Bed Mobility)  bed rails;head of bed elevated  -     Row Name 20 1440          Sit-Stand Transfer    Sit-Stand [place] (Transfers)  contact guard;minimum assist  (75% patient effort)  -SM     Assistive Device (Sit-Stand Transfers)  walker, front-wheeled  -SM     Row Name 08/12/20 1440          Gait/Stairs Assessment/Training    [place] Level (Gait)  contact guard;minimum assist (75% patient effort);2 person assist  -SM     Assistive Device (Gait)  walker, front-wheeled  -SM     Distance in Feet (Gait)  4ft forward and backwards  -SM     Pattern (Gait)  step-to  -SM     Deviations/Abnormal Patterns (Gait)  eliel decreased;stride length decreased;antalgic  -SM     Bilateral Gait Deviations  forward flexed posture  -SM     Left Sided Gait Deviations  weight shift ability decreased PWB boot on  -SM     Comment (Gait/Stairs)  cues for sequencing; pt remained forward flexed throughout  -SM       User Key  (r) = Recorded By, (t) = Taken By, (c) = Cosigned By    Initials Name Provider Type    Sonia Love PTA Physical Therapy Assistant        Obj/Interventions     Row Name 08/12/20 1445          Therapeutic Exercise    Lower Extremity (Therapeutic Exercise)  gluteal sets;heel slides, bilateral;quad sets, bilateral  -SM     Lower Extremity Range of Motion (Therapeutic Exercise)  hip abduction/adduction, bilateral  -SM     Exercise Type (Therapeutic Exercise)  AROM (active range of motion)  -SM     Position (Therapeutic Exercise)  supine  -SM     Sets/Reps (Therapeutic Exercise)  10 reps on most, 3 reps on L LE w/ heel slides and hip abd/add  -SM       User Key  (r) = Recorded By, (t) = Taken By, (c) = Cosigned By    Initials Name Provider Type    Sonia Love PTA Physical Therapy Assistant        Goals/Plan    No documentation.       Clinical Impression     Row Name 08/12/20 144          Pain Assessment    Additional Documentation  Pain Scale: Numbers Pre/Post-Treatment (Group)  -SM     [facility] Name 08/12/20 1447          Pain Scale: Numbers Pre/Post-Treatment    Pain Scale: Numbers, Pretreatment  3/10  -SM     Pain Scale: Numbers, Post-Treatment  7/10  -SM      Pain Location - Side  Left  -SM     Pain Location - Orientation  lower  -SM     Pain Location  extremity  -SM     Pain Intervention(s)  Repositioned;Ambulation/increased activity;Rest  -SM     Row Name 08/12/20 1447          Positioning and Restraints    Pre-Treatment Position  in bed  -SM     Post Treatment Position  bed  -SM     In Bed  supine;call light within reach;encouraged to call for assist;exit alarm on;with family/caregiver;notified nsg  -SM       User Key  (r) = Recorded By, (t) = Taken By, (c) = Cosigned By    Initials Name Provider Type    Sonia Love PTA Physical Therapy Assistant        Outcome Measures     Row Name 08/12/20 1449          How much help from another person do you currently need...    Turning from your back to your side while in flat bed without using bedrails?  3  -SM     Moving from lying on back to sitting on the side of a flat bed without bedrails?  3  -SM     Moving to and from a bed to a chair (including a wheelchair)?  3  -SM     Standing up from a chair using your arms (e.g., wheelchair, bedside chair)?  3  -SM     Climbing 3-5 steps with a railing?  1  -SM     To walk in hospital room?  2  -SM     AM-PAC 6 Clicks Score (PT)  15  -SM     Row Name 08/12/20 1447          Functional Assessment    Outcome Measure Options  AM-PAC 6 Clicks Basic Mobility (PT)  -       User Key  (r) = Recorded By, (t) = Taken By, (c) = Cosigned By    Initials Name Provider Type    Sonia Love PTA Physical Therapy Assistant        Physical Therapy Education                 Title: PT OT SLP Therapies (Done)     Topic: Physical Therapy (Done)     Point: Mobility training (Done)     Description:   Instruct learner(s) on safety and technique for assisting patient out of bed, chair or wheelchair.  Instruct in the proper use of assistive devices, such as walker, crutches, cane or brace.              Patient Friendly Description:   It's important to get you on your feet again, but  we need to do so in a way that is safe for you. Falling has serious consequences, and your personal safety is the most important thing of all.        When it's time to get out of bed, one of us or a family member will sit next to you on the bed to give you support.     If your doctor or nurse tells you to use a walker, crutches, a cane, or a brace, be sure you use it every time you get out of bed, even if you think you don't need it.    Learning Progress Summary           Patient Acceptance, E,TB,D, VU,NR by  at 8/12/2020 1449    Acceptance, E,TB,D, VU,NR by  at 8/11/2020 1658                   Point: Home exercise program (Done)     Description:   Instruct learner(s) on appropriate technique for monitoring, assisting and/or progressing patient with therapeutic exercises and activities.              Learning Progress Summary           Patient Acceptance, E,TB,D, VU,NR by  at 8/12/2020 1449    Acceptance, E,TB,D, VU,NR by  at 8/11/2020 1658                   Point: Body mechanics (Done)     Description:   Instruct learner(s) on proper positioning and spine alignment for patient and/or caregiver during mobility tasks and/or exercises.              Learning Progress Summary           Patient Acceptance, E,TB,D, VU,NR by  at 8/12/2020 1449    Acceptance, E,TB,D, VU,NR by  at 8/11/2020 1658                   Point: Precautions (Done)     Description:   Instruct learner(s) on prescribed precautions during mobility and gait tasks              Learning Progress Summary           Patient Acceptance, E,TB,D, VU,NR by  at 8/12/2020 1449    Acceptance, E,TB,D, VU,NR by  at 8/11/2020 1658                               User Key     Initials Effective Dates Name Provider Type Discipline     04/03/18 -  Franny Simon, PT Physical Therapist PT     03/07/18 -  Sonia Black PTA Physical Therapy Assistant PT              PT Recommendation and Plan     Outcome Summary/Treatment Plan (PT)  Anticipated  Discharge Disposition (PT): skilled nursing facility  Plan of Care Reviewed With: patient  Progress: improving  Outcome Summary: Pt tolerated treatment well this date. Increased gait distance to 4ft forward and backwards w/ only CGA-min A x2. Several cues given for sequencing while ambulating. Pt maintained PWB on L LE w/ boot on throughout. Tolerated a few supine bed exercises, though having more pain in L LE by end. Patient was wearing a face mask during this therapy encounter. Therapist used appropriate personal protective equipment including eye protection, mask, and gloves.  Mask used was standard procedure mask. Appropriate PPE was worn during the entire therapy session. Hand hygiene was completed before and after therapy session. Patient is not in enhanced droplet precautions.     Time Calculation:   PT Charges     Row Name 08/12/20 1453             Time Calculation    Start Time  1400  -      Stop Time  1438  -      Time Calculation (min)  38 min  -      PT Received On  08/12/20  -      PT - Next Appointment  08/13/20  -        User Key  (r) = Recorded By, (t) = Taken By, (c) = Cosigned By    Initials Name Provider Type     Sonia Black PTA Physical Therapy Assistant        Therapy Charges for Today     Code Description Service Date Service Provider Modifiers Qty    72836215420 HC PT THER PROC EA 15 MIN 8/12/2020 Sonia Black PTA GP 1    60397706590 HC PT THER SUPP EA 15 MIN 8/12/2020 Sonia Black PTA GP 1    02245511954 HC GAIT TRAINING EA 15 MIN 8/12/2020 Sonia Black PTA GP 1          PT G-Codes  Outcome Measure Options: AM-PAC 6 Clicks Basic Mobility (PT)  AM-PAC 6 Clicks Score (PT): 15    Sonia Black PTA  8/12/2020

## 2020-08-12 NOTE — PROGRESS NOTES
"Adult Nutrition  Assessment/PES    Patient Name:  Anderson Quick  YOB: 1927  MRN: [id number]  Admit Date:  8/10/2020    Assessment Date:  8/12/2020    Comments:  Nutrition assessment triggered by skin report.  Patient with stage II pressure injury to bilateral gluteal region and R cheek incision s/p recent surgery on cheek.  Admitted with weakness, possible infected wound.    Patient reports appetite not too good, says it has been like this for a very long time.  Then goes on to say he just eats less than he used to.  Reports weight loss, non significant.  Reports eating 3 meals/day at home and drinking Ensure 3-4 x/week.  Agrees to Boost daily during admission.  75% x 1 meal per chart PO data + 100% x 1 snack.    RD to continue to follow.    Reason for Assessment     Row Name 08/12/20 1532          Reason for Assessment    Reason For Assessment  identified at risk by screening criteria     Diagnosis  neurologic conditions;psychosocial;other (see comments);cardiac disease;gastrointestinal disease Parkinson's disease, CVA, depression, OA, HLD, arthritis, GERD; adm with weakness     Identified At Risk by Screening Criteria  large or nonhealing wound, burn or pressure injury         Nutrition/Diet History     Row Name 08/12/20 1533          Nutrition/Diet History    Typical Food/Fluid Intake  patient reports poor appetite, but says it has been like this for a very long time     Supplemental Drinks/Foods/Additives  drinks Ensure 3-4 x/week         Anthropometrics     Row Name 08/12/20 1533          Anthropometrics    Height  182.9 cm (72.01\")     Weight  77.1 kg (170 lb)        Admit Weight    Admit Weight  -- 170# 8/10        Ideal Body Weight (IBW)    Ideal Body Weight (IBW) (kg)  82.09     % Ideal Body Weight  93.93        Usual Body Weight (UBW)    Usual Body Weight  88.5 kg (195 lb)     % Usual Body Weight  87.18     % of Usual Body Weight Assessment  85-95% - mild deficit 87.2%     Weight Loss  " "unintentional 25# (12.8%)     Weight Loss Time Frame  1 year        Body Mass Index (BMI)    BMI (kg/m2)  23.1     BMI Assessment  BMI 18.5-24.9: normal 23.00         Labs/Tests/Procedures/Meds     Row Name 08/12/20 1537          Labs/Procedures/Meds    Lab Results Reviewed  reviewed, pertinent     Lab Results Comments  Creat, Alb, Hgb, Hct        Diagnostic Tests/Procedures    Diagnostic Test/Procedure Reviewed  reviewed, pertinent     Diagnostic Test/Procedures Comments  recent surgery on R cheek        Medications    Pertinent Medications Reviewed  reviewed, pertinent     Pertinent Medications Comments  sinemet, colace, protonix, crestor         Physical Findings     Row Name 08/12/20 1537          Physical Findings    Overall Physical Appearance  generalized wasting     Skin  pressure injury;surgical incision B=15, R cheek inc, st II bilateral gluteal region         Estimated/Assessed Needs     Row Name 08/12/20 1538 08/12/20 1533       Calculation Measurements    Weight Used For Calculations  77.1 kg (169 lb 15.6 oz)  --    Height  --  182.9 cm (72.01\")       Estimated/Assessed Needs       KCAL/KG    KCAL/KG  30 Kcal/Kg (kcal);25 Kcal/Kg (kcal)  --    25 Kcal/Kg (kcal)  1927.5  --    30 Kcal/Kg (kcal)  2313  --       Protein Requirements    Weight Used For Protein Calculations  77.1 kg (169 lb 15.6 oz)  --    Est Protein Requirement Amount (gms/kg); used 1.2-1.5 g/kg    --       Fluid Requirements    Estimated Fluid Requirements (mL/day)  2300  --        Nutrition Prescription Ordered     Row Name 08/12/20 1538          Nutrition Prescription PO    Current PO Diet  Regular         Evaluation of Received Nutrient/Fluid Intake     Row Name 08/12/20 1539          PO Evaluation    Number of Meals  1     % PO Intake  75         Problem/Interventions:  Problem 1     Row Name 08/12/20 1539          Nutrition Diagnoses Problem 1    Problem 1  Increased Nutrient Needs     Macronutrient  Kcal;Protein     Etiology " (related to)  Medical Diagnosis     Skin  Pressure injury     Signs/Symptoms (evidenced by)  Report/Observation         Intervention Goal     Row Name 08/12/20 1539          Intervention Goal    General  Maintain nutrition;Reduce/improve symptoms;Disease management/therapy;Meet nutritional needs for age/condition     PO  Maintain intake     Weight  Maintain weight         Nutrition Intervention     Row Name 08/12/20 1539          Nutrition Intervention    RD/Tech Action  Follow Tx progress;Care plan reviewd;Encourage intake;Recommend/ordered     Recommended/Ordered  Supplement         Nutrition Prescription     Row Name 08/12/20 1539          Nutrition Prescription PO    PO Prescription  Begin/change supplement     Supplement  Boost Plus     Supplement Frequency  Daily     New PO Prescription Ordered?  Yes         Education/Evaluation     Row Name 08/12/20 1539          Education    Education  Will Instruct as appropriate        Monitor/Evaluation    Monitor  Per protocol;PO intake;Supplement intake;Pertinent labs;Weight;Skin status;Symptoms     Education Follow-up  Reinforce PRN           Electronically signed by:  Mary Ellen Arredondo RD  08/12/20 15:39

## 2020-08-12 NOTE — DISCHARGE PLACEMENT REQUEST
"FrancheskaAnderson landin (89 y.o. Male)     Date of Birth Social Security Number Address Home Phone MRN    10/16/1927  [address] [facility] [address] [phone] [id number]    [employer] Marital Status          None        Admission Date Admission Type Admitting Provider Attending Provider Department, Room/Bed    8/10/20 Emergency Samuel Arroyo MD Ahmed, Aftab, MD [address], [address]/1    Discharge Date Discharge Disposition Discharge Destination                       Attending Provider:  Samuel Arroyo MD    Allergies:  Tape  [Adhesive Tape]    Isolation:  None   Infection:  None   Code Status:  CPR    Ht:  182.9 cm (72\")   Wt:  77.1 kg (170 lb)    Admission Cmt:  None   Principal Problem:  None                Active Insurance as of 8/10/2020     Primary Coverage     Payor Plan Insurance Group Employer/Plan Group    [facility] MEDICARE REPLACEMENT [facility] MEDICARE REPLACEMENT [id number]     Payor Plan Address Payor Plan Phone Number Payor Plan Fax Number Effective Dates    [address] [address]   1/1/2018 - None Entered    [facility] [id number]       Subscriber Name Subscriber Birth Date Member ID       ANDERSON JOYA 10/16/1927 [id number]                 Emergency Contacts      (Rel.) Home Phone Work Phone Mobile Phone    Sherly Joya [phone] -- --              "

## 2020-08-12 NOTE — PLAN OF CARE
Pt alert and oriented x4. Resp even and unlabored. Boot on left lower extremity. Pt foot warm and can move toes without difficulty. Pt calm and cooperative. Pt reports pain in back and slight pain in lle. Pain medications admin as ordered. Pt has pressure wound to both gluteal cheeks from prior to admission. Barrier creme applied as ordered. Pt encourage to reposition and allow repositioning to side to off load pressure to buttocks.

## 2020-08-12 NOTE — PROGRESS NOTES
"Daily progress note    Chief complaint  Doing little better  No new complaints  Agreeable to go to rehab    History of present illness  89-year-old white male with history of Parkinson's disease stroke neuropathy movement disorder depression osteoarthritis hyperlipidemia who lives at home with his wife brought to the emergency room with multiple falls generalized weakness left ankle foot pain.  Patient has injured the foot few days ago.  Patient fully alert oriented complaining of pain at the injury site but no other complaint.  Patient evaluated in ER found to have possible infected surgical wound as he has recent surgery on right cheek with some drainage but no fever chills.  Patient also have distal left fibula fracture and left foot sprain.  Patient has multiple bruising and contusion of the scalp on evaluation.  Patient admitted for management     REVIEW OF SYSTEMS  All systems reviewed and negative except for those discussed in HPI.      PHYSICAL EXAM  Blood pressure 119/68, pulse 98, temperature 97.9 °F (36.6 °C), temperature source Oral, resp. rate 18, height 182.9 cm (72\"), weight 77.1 kg (170 lb), SpO2 95 %.    GENERAL: Not distressed  HENT: Mild bruising  EYES: no scleral icterus, PERRLA, extraocular movements intact  CV: regular rhythm, regular rate  RESPIRATORY: normal effort CTA B  ABDOMEN: soft, nontender, nondistended  MUSCULOSKELETAL: Significant amount of ecchymosis and 2+ pitting edema to the left foot and ankle.  DP and PT pulses are 1+, sensation is intact distally and cap refill is less than 2 seconds.  Limited range of motion of the left ankle due to pain.  There is no proximal tib-fib tenderness or metatarsal tenderness.  NEURO: alert, moves all extremities, follows commands  SKIN: warm, dry     LAB RESULTS  Lab Results (last 24 hours)     Procedure Component Value Units Date/Time    COVID PRE-OP / PRE-PROCEDURE SCREENING ORDER (NO ISOLATION) - Swab, Nasopharynx [[id number]] Collected:  " 08/10/20 1604    Specimen:  Swab from Nasopharynx Updated:  08/12/20 1041    Narrative:       The following orders were created for panel order COVID PRE-OP / PRE-PROCEDURE SCREENING ORDER (NO ISOLATION) - Swab, Nasopharynx.  Procedure                               Abnormality         Status                     ---------                               -----------         ------                     COVID-19,BIOTAP, NP/OP S...[[id number]]                      Final result                 Please view results for these tests on the individual orders.    COVID-19,BIOTAP, NP/OP SWAB IN TRANSPORT MEDIA OR SALINE 24-36 HR TAT - Swab, Nasopharynx [[id number]] Collected:  08/10/20 1604    Specimen:  Swab from Nasopharynx Updated:  08/12/20 1041     Reference Lab Report --     COVID19 Not Detected    Narrative:       Testing performed at:  [facility]  [address] [place] [address]    BNP [[id number]]  (Normal) Collected:  08/12/20 0459    Specimen:  Blood Updated:  08/12/20 0619     proBNP 1,271.0 pg/mL     Narrative:       Among patients with dyspnea, NT-proBNP is highly sensitive for the detection of acute congestive heart failure. In addition NT-proBNP of <300 pg/ml effectively rules out acute congestive heart failure with 99% negative predictive value.    Results may be falsely decreased if patient taking Biotin.      TSH [[id number]]  (Normal) Collected:  08/12/20 0459    Specimen:  Blood Updated:  08/12/20 0619     TSH 1.700 uIU/mL     Comprehensive Metabolic Panel [[id number]]  (Abnormal) Collected:  08/12/20 0459    Specimen:  Blood Updated:  08/12/20 0612     Glucose 95 mg/dL      BUN 12 mg/dL      Creatinine 0.73 mg/dL      Sodium 138 mmol/L      Potassium 4.3 mmol/L      Chloride 105 mmol/L      CO2 27.2 mmol/L      Calcium 9.1 mg/dL      Total Protein 6.7 g/dL      Albumin 3.30 g/dL      ALT (SGPT) <5 U/L      AST (SGOT) 12 U/L      Alkaline Phosphatase 43 U/L      Total Bilirubin 0.6 mg/dL       eGFR Non African Amer 100 mL/min/1.73      Globulin 3.4 gm/dL      A/G Ratio 1.0 g/dL      BUN/Creatinine Ratio 16.4     Anion Gap 5.8 mmol/L     Narrative:       GFR Normal >60  Chronic Kidney Disease <60  Kidney Failure <15      Lipid Panel [[id number]] Collected:  08/12/20 0459    Specimen:  Blood Updated:  08/12/20 0612     Total Cholesterol 117 mg/dL      Triglycerides 63 mg/dL      HDL Cholesterol 48 mg/dL      LDL Cholesterol  56 mg/dL      VLDL Cholesterol 12.6 mg/dL      LDL/HDL Ratio 1.18    Narrative:       Cholesterol Reference Ranges  (U.S. Department of Health and Human Services ATP III Classifications)    Desirable          <200 mg/dL  Borderline High    200-239 mg/dL  High Risk          >240 mg/dL      Triglyceride Reference Ranges  (U.S. Department of Health and Human Services ATP III Classifications)    Normal           <150 mg/dL  Borderline High  150-199 mg/dL  High             200-499 mg/dL  Very High        >500 mg/dL    HDL Reference Ranges  (U.S. Department of Health and Human Services ATP III Classifcations)    Low     <40 mg/dl (major risk factor for CHD)  High    >60 mg/dl ('negative' risk factor for CHD)        LDL Reference Ranges  (U.S. Department of Health and Human Services ATP III Classifcations)    Optimal          <100 mg/dL  Near Optimal     100-129 mg/dL  Borderline High  130-159 mg/dL  High             160-189 mg/dL  Very High        >189 mg/dL    Hemoglobin A1c [[id number]]  (Normal) Collected:  08/12/20 0459    Specimen:  Blood Updated:  08/12/20 0600     Hemoglobin A1C 5.30 %     Narrative:       Hemoglobin A1C Ranges:    Increased Risk for Diabetes  5.7% to 6.4%  Diabetes                     >= 6.5%  Diabetic Goal                < 7.0%    CBC (No Diff) [[id number]]  (Abnormal) Collected:  08/12/20 0459    Specimen:  Blood Updated:  08/12/20 0534     WBC 4.96 10*3/mm3      RBC 3.11 10*6/mm3      Hemoglobin 10.2 g/dL      Hematocrit 29.8 %      MCV 95.8 fL      MCH 32.8 pg       MCHC 34.2 g/dL      RDW 14.4 %      RDW-SD 50.3 fl      MPV 9.6 fL      Platelets 232 10*3/mm3         Imaging Results (Last 24 Hours)     ** No results found for the last 24 hours. **        ECG 12 Lead        HEART RATE= 71  bpm  RR Interval= 876  ms  WY Interval= 177  ms  P Horizontal Axis=   deg  P Front Axis= -16  deg  QRSD Interval= 86  ms  QT Interval= 416  ms  QRS Axis= -15  deg  T Wave Axis= 24  deg  - OTHERWISE NORMAL ECG -  Sinus rhythm  Ventricular premature complex  Borderline left axis deviation  NO PRIOR TRACING AVAILABLE FOR COMPARISON             Current Facility-Administered Medications:   •  aspirin-dipyridamole (AGGRENOX)  MG per 12 hr capsule 1 capsule, 1 capsule, Oral, BID, Samuel Arroyo MD, 1 capsule at 08/12/20 0906  •  brimonidine-timolol (COMBIGAN) 0.2-0.5 % ophthalmic solution 1 drop, 1 drop, Both Eyes, Q12H, Samuel Arroyo MD, 1 drop at 08/12/20 1202  •  carbidopa-levodopa (SINEMET)  MG per tablet 2 tablet, 2 tablet, Oral, TID, Samuel Arroyo MD, 2 tablet at 08/12/20 0906  •  ceFAZolin (ANCEF) IVPB 1 g, 1 g, Intravenous, Q8H, Samuel Arroyo MD, Last Rate: 0 mL/hr at 08/12/20 0855, 1 g at 08/12/20 1202  •  docusate sodium (COLACE) capsule 100 mg, 100 mg, Oral, Daily, Samuel Arroyo MD, 100 mg at 08/12/20 0906  •  gabapentin (NEURONTIN) capsule 600 mg, 600 mg, Oral, Q8H, Samuel Arroyo MD, 600 mg at 08/12/20 0603  •  hydrocortisone-bacitracin-zinc oxide-nystatin (MAGIC BARRIER) cream 1 application, 1 application, Topical, BID, Samuel Arroyo MD, 1 application at 08/12/20 0906  •  oxyCODONE-acetaminophen (PERCOCET)  MG per tablet 1 tablet, 1 tablet, Oral, Q6H PRN, Samuel Arroyo MD, 1 tablet at 08/12/20 0920  •  pantoprazole (PROTONIX) EC tablet 40 mg, 40 mg, Oral, Q AM, Samuel Arroyo MD, 40 mg at 08/12/20 0603  •  rosuvastatin (CRESTOR) tablet 5 mg, 5 mg, Oral, Daily, Samuel Arroyo MD, 5 mg at 08/12/20 0906  •  sodium chloride 0.9 % infusion, 50 mL/hr, Intravenous, Continuous,  Elvia Arroyo MD, Last Rate: 50 mL/hr at 08/12/20 1111, 50 mL/hr at 08/12/20 1111     ASSESSMENT  Left distal fibula fracture  Left foot sprain  Status post multiple falls  Right cheek wound infection  Parkinson disease  Movement disorder  Status post CVA in the past  Hyperlipidemia  Neuropathy  Osteoarthritis  Gastroesophageal reflux disease    PLAN  CPM  IV antibiotics  Pain management  Left leg below-knee fracture boot  Nonoperative treatment with weightbearing as tolerated left lower extremity  Orthopedic surgery consult appreciated  Adjust home medications  Stress ulcer DVT prophylaxis  PT OT  Supportive care  Discussed with nursing staff  Discharge planning with subacute rehab if bed available in a.m.    ELVIA ARROYO MD

## 2020-08-12 NOTE — PLAN OF CARE
Problem: Patient Care Overview  Goal: Plan of Care Review  Outcome: Ongoing (interventions implemented as appropriate)  Flowsheets (Taken 8/12/2020 4284)  Progress: improving  Plan of Care Reviewed With: patient  Outcome Summary: Pt tolerated treatment well this date. Increased gait distance to 4ft forward and backwards w/ only CGA-min A x2. Several cues given for sequencing while ambulating. Pt maintained PWB on L LE w/ boot on throughout. Tolerated a few supine bed exercises, though having more pain in L LE by end. Patient was wearing a face mask during this therapy encounter. Therapist used appropriate personal protective equipment including eye protection, mask, and gloves.  Mask used was standard procedure mask. Appropriate PPE was worn during the entire therapy session. Hand hygiene was completed before and after therapy session. Patient is not in enhanced droplet precautions. Also present: Nicholas PT tech.

## 2020-08-12 NOTE — PROGRESS NOTES
Continued Stay Note  [facility]     Patient Name: Anderson Quick  MRN: [id number]  Today's Date: 8/12/2020    Admit Date: 8/10/2020    Discharge Plan     Row Name 08/12/20 1340       Plan    Plan  referral to [facility]     Plan Comments  Spoke with pt's wife, Sherly [phone]) by phone to discuss rehab.  She states pt has been to [facility] in the past and that would be their choice.  Referral made.   Savanna Smith RN        Discharge Codes    No documentation.             Savanna Smith RN

## 2020-08-12 NOTE — PLAN OF CARE
Problem: Patient Care Overview  Goal: Plan of Care Review  Outcome: Ongoing (interventions implemented as appropriate)  Flowsheets (Taken 8/12/2020 1902)  Progress: improving  Plan of Care Reviewed With: patient; spouse  Note:   A&Ox4, forgetful at times. C/o buttocks/back/LLE pain, percocet given x2. Last dose @ 1530. D/C'd IVFs. Pt reluctant to turn Q 2hrs. Educated pt re: PI and turning will facilitate healing. CTM. Probably d/c to rehab tomorrow.

## 2020-08-13 VITALS
OXYGEN SATURATION: 97 % | SYSTOLIC BLOOD PRESSURE: 118 MMHG | HEART RATE: 70 BPM | DIASTOLIC BLOOD PRESSURE: 83 MMHG | HEIGHT: 72 IN | BODY MASS INDEX: 23.03 KG/M2 | RESPIRATION RATE: 18 BRPM | TEMPERATURE: 97.8 F | WEIGHT: 170 LBS

## 2020-08-13 LAB
ANION GAP SERPL CALCULATED.3IONS-SCNC: 6.6 MMOL/L (ref 5–15)
BUN SERPL-MCNC: 12 MG/DL (ref 8–23)
BUN/CREAT SERPL: 17.1 (ref 7–25)
CALCIUM SPEC-SCNC: 8.6 MG/DL (ref 8.2–9.6)
CHLORIDE SERPL-SCNC: 104 MMOL/L (ref 98–107)
CO2 SERPL-SCNC: 25.4 MMOL/L (ref 22–29)
CREAT SERPL-MCNC: 0.7 MG/DL (ref 0.76–1.27)
DEPRECATED RDW RBC AUTO: 49.3 FL (ref 37–54)
ERYTHROCYTE [DISTWIDTH] IN BLOOD BY AUTOMATED COUNT: 14.3 % (ref 12.3–15.4)
GFR SERPL CREATININE-BSD FRML MDRD: 105 ML/MIN/1.73
GLUCOSE SERPL-MCNC: 100 MG/DL (ref 65–99)
HCT VFR BLD AUTO: 30 % (ref 37.5–51)
HGB BLD-MCNC: 10.2 G/DL (ref 13–17.7)
MCH RBC QN AUTO: 32.9 PG (ref 26.6–33)
MCHC RBC AUTO-ENTMCNC: 34 G/DL (ref 31.5–35.7)
MCV RBC AUTO: 96.8 FL (ref 79–97)
PLATELET # BLD AUTO: 228 10*3/MM3 (ref 140–450)
PMV BLD AUTO: 9.7 FL (ref 6–12)
POTASSIUM SERPL-SCNC: 3.8 MMOL/L (ref 3.5–5.2)
RBC # BLD AUTO: 3.1 10*6/MM3 (ref 4.14–5.8)
SODIUM SERPL-SCNC: 136 MMOL/L (ref 136–145)
WBC # BLD AUTO: 4.96 10*3/MM3 (ref 3.4–10.8)

## 2020-08-13 PROCEDURE — 85027 COMPLETE CBC AUTOMATED: CPT | Performed by: HOSPITALIST

## 2020-08-13 PROCEDURE — 80048 BASIC METABOLIC PNL TOTAL CA: CPT | Performed by: HOSPITALIST

## 2020-08-13 PROCEDURE — 97116 GAIT TRAINING THERAPY: CPT

## 2020-08-13 PROCEDURE — G0378 HOSPITAL OBSERVATION PER HR: HCPCS

## 2020-08-13 PROCEDURE — 96366 THER/PROPH/DIAG IV INF ADDON: CPT

## 2020-08-13 PROCEDURE — 97535 SELF CARE MNGMENT TRAINING: CPT

## 2020-08-13 PROCEDURE — 97166 OT EVAL MOD COMPLEX 45 MIN: CPT

## 2020-08-13 PROCEDURE — 97110 THERAPEUTIC EXERCISES: CPT

## 2020-08-13 PROCEDURE — 25010000003 CEFAZOLIN 1-4 GM/50ML-% SOLUTION: Performed by: HOSPITALIST

## 2020-08-13 RX ORDER — CEPHALEXIN 500 MG/1
500 CAPSULE ORAL EVERY 8 HOURS SCHEDULED
Qty: 15 CAPSULE | Refills: 0 | Status: SHIPPED | OUTPATIENT
Start: 2020-08-13 | End: 2020-08-18

## 2020-08-13 RX ORDER — PANTOPRAZOLE SODIUM 40 MG/1
40 TABLET, DELAYED RELEASE ORAL DAILY
Qty: 30 TABLET | Refills: 0 | Status: SHIPPED | OUTPATIENT
Start: 2020-08-13 | End: 2020-09-12

## 2020-08-13 RX ORDER — CEPHALEXIN 500 MG/1
500 CAPSULE ORAL EVERY 8 HOURS SCHEDULED
Status: DISCONTINUED | OUTPATIENT
Start: 2020-08-13 | End: 2020-08-13 | Stop reason: HOSPADM

## 2020-08-13 RX ORDER — ACETAMINOPHEN 325 MG/1
650 TABLET ORAL EVERY 6 HOURS PRN
Status: DISCONTINUED | OUTPATIENT
Start: 2020-08-13 | End: 2020-08-13

## 2020-08-13 RX ORDER — GABAPENTIN 600 MG/1
1200 TABLET ORAL 3 TIMES DAILY
Qty: 10 TABLET | Refills: 0 | Status: SHIPPED | OUTPATIENT
Start: 2020-08-13 | End: 2020-08-16

## 2020-08-13 RX ORDER — OXYCODONE AND ACETAMINOPHEN 10; 325 MG/1; MG/1
1 TABLET ORAL EVERY 6 HOURS PRN
Qty: 10 TABLET | Refills: 0 | Status: SHIPPED | OUTPATIENT
Start: 2020-08-13 | End: 2020-08-16

## 2020-08-13 RX ADMIN — BRIMONIDINE TARTRATE, TIMOLOL MALEATE 1 DROP: 2; 5 SOLUTION/ DROPS OPHTHALMIC at 14:20

## 2020-08-13 RX ADMIN — ROSUVASTATIN CALCIUM 5 MG: 5 TABLET, FILM COATED ORAL at 08:29

## 2020-08-13 RX ADMIN — GABAPENTIN 600 MG: 300 CAPSULE ORAL at 05:38

## 2020-08-13 RX ADMIN — ASPRIN AND EXTENDED-RELEASE DIPYRIDAMOLE 1 CAPSULE: 25; 200 CAPSULE ORAL at 08:29

## 2020-08-13 RX ADMIN — Medication 1 APPLICATION: at 08:29

## 2020-08-13 RX ADMIN — CEFAZOLIN SODIUM 1 G: 1 INJECTION, SOLUTION INTRAVENOUS at 05:38

## 2020-08-13 RX ADMIN — GABAPENTIN 600 MG: 300 CAPSULE ORAL at 14:20

## 2020-08-13 RX ADMIN — OXYCODONE HYDROCHLORIDE AND ACETAMINOPHEN 1 TABLET: 10; 325 TABLET ORAL at 08:29

## 2020-08-13 RX ADMIN — DOCUSATE SODIUM 100 MG: 100 CAPSULE, LIQUID FILLED ORAL at 08:29

## 2020-08-13 RX ADMIN — CARBIDOPA AND LEVODOPA 2 TABLET: 25; 100 TABLET ORAL at 14:19

## 2020-08-13 RX ADMIN — CARBIDOPA AND LEVODOPA 2 TABLET: 25; 100 TABLET ORAL at 08:29

## 2020-08-13 RX ADMIN — PANTOPRAZOLE SODIUM 40 MG: 40 TABLET, DELAYED RELEASE ORAL at 05:38

## 2020-08-13 NOTE — PROGRESS NOTES
Continued Stay Note  [facility]     Patient Name: Anderson Quick  MRN: [id number]  Today's Date: 8/13/2020    Admit Date: 8/10/2020    Discharge Plan     Row Name 08/13/20 1608       Plan    Plan Comments  DC orders noted.  Spoke with pt and her dtr Serina who are in agreement with DC to [facility] today.  S/W [facility]/ [facility], bed remains available.  DC summary and DC packet given to RN.  Jenna w/c [facility] transport arranged for today at 1600.    Final Note  DC to skilled bed at [facility].        Discharge Codes    No documentation.       Expected Discharge Date and Time     Expected Discharge Date Expected Discharge Time    Aug 13, 2020             Jacinda Lovell RN

## 2020-08-13 NOTE — THERAPY EVALUATION
Acute Care - Occupational Therapy Initial Evaluation  [facility]     Patient Name: Anderson Quick  : 10/16/1927  MRN: [id number]  Today's Date: 2020             Admit Date: 8/10/2020       ICD-10-CM ICD-9-CM   1. Generalized weakness R53.1 780.79   2. Closed fracture of distal end of left fibula, unspecified fracture morphology, initial encounter S82.832A 824.8   3. Foot sprain, left, initial encounter S93.602A 845.10   4. Infected surgical wound T81.49XA 998.59   5. Contusion of scalp, initial encounter S00.03XA 920     Patient Active Problem List   Diagnosis   • Generalized weakness     Past Medical History:   Diagnosis Date   • Arthritis    • Chronic pain    • Depression    • Movement disorder     Parkinsons   • Peripheral neuropathy    • Stroke (CMS/HCC)      Past Surgical History:   Procedure Laterality Date   • CARDIAC VALVE REPLACEMENT            OT ASSESSMENT FLOWSHEET (last 12 hours)      Occupational Therapy Evaluation     Row Name 20 0846                   OT Evaluation Time/Intention    Subjective Information  complains of;pain  -SM        Document Type  evaluation  -SM        Mode of Treatment  occupational therapy;individual therapy  -SM        Patient Effort  fair  -SM        Symptoms Noted During/After Treatment  none  -SM           General Information    Patient Profile Reviewed?  yes  -SM        Patient Observations  alert;agree to therapy  -SM        General Observations of Patient  Pt resting in bed, agreeable to OT but limited participation in OOB ax or seated ax due to pain and pt report wanting to stay comfortable in bed.   -SM        Prior Level of Function  independent:;ADL's;all household mobility  -SM        Equipment Currently Used at Home  shower chair;walker, rolling;wheelchair;other (see comments) w/c used only for long distances  -SM        Existing Precautions/Restrictions  fall;weight bearing WBAT LLE, LLE brace for OOB ax  -SM           Relationship/Environment     Lives With  spouse  -           Cognitive Assessment/Intervention- PT/OT    Orientation Status (Cognition)  oriented x 3  -        Follows Commands (Cognition)  follows one step commands;over 90% accuracy  -        Safety Deficit (Cognitive)  mild deficit;other (see comments) appear to be mildly confused, often off topic conversation  -           Safety Issues, Functional Mobility    Impairments Affecting Function (Mobility)  pain  -           Bed Mobility Assessment/Treatment    Comment (Bed Mobility)  unable to assess due to pt declining   -           Functional Mobility    Functional Mobility- Comment  unable to assess due to pt declining  -           Transfer Assessment/Treatment    Comment (Transfers)  unable to assess due to pt declining  -           ADL Assessment/Intervention    BADL Assessment/Intervention  bathing;upper body dressing;lower body dressing;grooming;toileting  -           Bathing Assessment/Intervention    Comment (Bathing)  Unable to assess due to pt declining, would need signicant assistance with lower body bathing.   -           Lower Body Dressing Assessment/Training    Comment (Lower Body Dressing)  Unable to assess due to pt declining, would need significant assistance with all LB ADLs.   -           Grooming Assessment/Training    [place] Level (Grooming)  grooming skills;shave face;set up  -        Assistive Devices (Grooming)  electric razor  -        Grooming Position  supine reclined in bed  -        Comment (Grooming)  Encouraged to sit EOB to engage in grooming ax, due to pain pt completed supine in bed.   -           Toileting Assessment/Training    Comment (Toileting)  Pt reports he has only been using BSC with nursing due to difficulty with functional mobility to bathroom, declined need to complete with OT.   -           General ROM    GENERAL ROM COMMENTS  BUE AROM WFL  -           MMT (Manual Muscle Testing)    General MMT Comments   "BUE grossly 4-/5  -SM           Positioning and Restraints    Pre-Treatment Position  in bed  -SM        Post Treatment Position  bed  -SM        In Bed  fowlers;call light within reach;encouraged to call for assist;exit alarm on  -SM           Pain Scale: Numbers Pre/Post-Treatment    Pain Scale: Numbers, Pretreatment  8/10  -SM        Pain Scale: Numbers, Post-Treatment  8/10  -SM        Pain Location - Side  Left  -SM        Pain Location  foot  -SM        Pain Intervention(s)  Repositioned;Medication (See MAR) Pt has just recieved pain med with nursing.   -SM           Wound 08/10/20 1903 Right cheek Incision    Wound - Properties Group Date first assessed: 08/10/20  -DC Time first assessed: 1903  -DC Present on Hospital Admission: Y  -DC Side: Right  -DC Location: cheek  -DC Primary Wound Type: Incision  -DC       Wound 08/11/20 1130 Bilateral gluteal Pressure Injury    Wound - Properties Group Date first assessed: 08/11/20  -CJ Time first assessed: 1130  -CJ Present on Hospital Admission: Y  -CJ Side: Bilateral  -CJ Location: gluteal  -CJ Primary Wound Type: Pressure inj  -CJ Stage, Pressure Injury: Stage 2  -CJ Additional Comments: shear/ moisture  -CJ       Plan of Care Review    Plan of Care Reviewed With  patient  -        Outcome Summary  Pt is a 89 y.o male admitted to [facility] from home after fall at home resulting in closed fx of fibula. Pt is limited in ADL performance secondary to pain, functional mobility, and transfer status. Pt encouraged to complete OOB or sitting EOB with OT with pt declining and completes grooming ADLs reclined in bed with set up. Pt would require max A for lower body ADLs. Pt plans to d/c to SNF to subacte rehab prior to returning home with wife.   -           Clinical Impression (OT)    Functional Level at Time of Evaluation (OT Eval)  pain, impaired functional mobility, impaired transfer.   -        Patient/Family Goals Statement (OT Eval)  \"go to rehab then back home\"  - "        Criteria for Skilled Therapeutic Interventions Met (OT Eval)  yes;treatment indicated  -SM        Rehab Potential (OT Eval)  fair, will monitor progress closely  -SM        Therapy Frequency (OT Eval)  3 times/wk  -SM        Care Plan Review (OT)  evaluation/treatment results reviewed;care plan/treatment goals reviewed;patient/other agree to care plan  -SM        Anticipated Equipment Needs at Discharge (OT)  other (see comments) TBD at next level of care.   -SM        Anticipated Discharge Disposition (OT)  skilled nursing facility  -SM           OT Goals    Transfer Goal Selection (OT)  transfer, OT goal 1  -SM        Dressing Goal Selection (OT)  dressing, OT goal 1  -SM        Toileting Goal Selection (OT)  toileting, OT goal 1  -SM           Transfer Goal 1 (OT)    Activity/Assistive Device (Transfer Goal 1, OT)  transfers, all  -SM        [place] Level/Cues Needed (Transfer Goal 1, OT)  minimum assist (75% or more patient effort)  -SM        Time Frame (Transfer Goal 1, OT)  short term goal (STG);2 weeks  -SM        Progress/Outcome (Transfer Goal 1, OT)  continuing progress toward goal  -SM           Dressing Goal 1 (OT)    Activity/Assistive Device (Dressing Goal 1, OT)  lower body dressing  -SM        [place]/Cues Needed (Dressing Goal 1, OT)  minimum assist (75% or more patient effort)  -SM        Time Frame (Dressing Goal 1, OT)  short term goal (STG);2 weeks  -SM        Progress/Outcome (Dressing Goal 1, OT)  continuing progress toward goal  -SM           Toileting Goal 1 (OT)    Activity/Device (Toileting Goal 1, OT)  toileting skills, all;commode, bedside without drop arms  -SM        [place] Level/Cues Needed (Toileting Goal 1, OT)  minimum assist (75% or more patient effort)  -SM        Time Frame (Toileting Goal 1, OT)  short term goal (STG);2 weeks  -SM        Progress/Outcome (Toileting Goal 1, OT)  continuing progress toward goal  -SM          User Key  (r) = Recorded By,  (t) = Taken By, (c) = Cosigned By    Initials Name Effective Dates    CJ Chaz Denton RN 09/21/18 -     Zoie Arriaga RN 11/15/19 -     Sonia Paulino OT 04/02/20 -          Occupational Therapy Education                 Title: PT OT SLP Therapies (In Progress)     Topic: Occupational Therapy (In Progress)     Point: ADL training (Done)     Description:   Instruct learner(s) on proper safety adaptation and remediation techniques during self care or transfers.   Instruct in proper use of assistive devices.              Learning Progress Summary           Patient Acceptance, E, VU by  at 8/13/2020 1010    Comment:  Pt educated in role of OT in acute setting to focus on self care and transfer goals.                   Point: Home exercise program (Not Started)     Description:   Instruct learner(s) on appropriate technique for monitoring, assisting and/or progressing therapeutic exercises/activities.              Learner Progress:   Not documented in this visit.          Point: Precautions (Not Started)     Description:   Instruct learner(s) on prescribed precautions during self-care and functional transfers.              Learner Progress:   Not documented in this visit.          Point: Body mechanics (Not Started)     Description:   Instruct learner(s) on proper positioning and spine alignment during self-care, functional mobility activities and/or exercises.              Learner Progress:   Not documented in this visit.                      User Key     Initials Effective Dates Name Provider Type Discipline     04/02/20 -  Sonia Cruz, HERI Occupational Therapist OT                  OT Recommendation and Plan  Outcome Summary/Treatment Plan (OT)  Anticipated Equipment Needs at Discharge (OT): other (see comments)(TBD at next level of care. )  Anticipated Discharge Disposition (OT): skilled nursing facility  Therapy Frequency (OT Eval): 3 times/wk  Plan of Care Review  Plan of Care  Reviewed With: patient  Plan of Care Reviewed With: patient  Outcome Summary: Pt is a 89 y.o male admitted to [facility] from home after fall at home resulting in closed fx of fibula. Pt is limited in ADL performance secondary to pain, functional mobility, and transfer status. Pt encouraged to complete OOB or sitting EOB with OT with pt declining and completes grooming ADLs reclined in bed with set up. Pt would require max A for lower body ADLs. Pt plans to d/c to SNF to subacte rehab prior to returning home with wife.     Outcome Measures     Row Name 08/13/20 1000             How much help from another is currently needed...    Putting on and taking off regular lower body clothing?  2  -SM      Bathing (including washing, rinsing, and drying)  2  -SM      Toileting (which includes using toilet bed pan or urinal)  2  -SM      Putting on and taking off regular upper body clothing  3  -SM      Taking care of personal grooming (such as brushing teeth)  3  -SM      Eating meals  4  -SM      AM-PAC 6 Clicks Score (OT)  16  -SM         Functional Assessment    Outcome Measure Options  AM-PAC 6 Clicks Daily Activity (OT)  -SM        User Key  (r) = Recorded By, (t) = Taken By, (c) = Cosigned By    Initials Name Provider Type    Sonia Paulino OT Occupational Therapist          Time Calculation:   Time Calculation- OT     Row Name 08/13/20 1014             Time Calculation- OT    OT Start Time  0833  -      OT Stop Time  0845  -      OT Time Calculation (min)  12 min  -      Total Timed Code Minutes- OT  8 minute(s)  -      OT Received On  08/13/20  -      OT - Next Appointment  08/15/20  -      OT Goal Re-Cert Due Date  08/27/20  -        User Key  (r) = Recorded By, (t) = Taken By, (c) = Cosigned By    Initials Name Provider Type    Sonia Paulino OT Occupational Therapist        Therapy Charges for Today     Code Description Service Date Service Provider Modifiers Qty    [id number]  OT EVAL MOD  COMPLEXITY 2 8/13/2020 Sonia Cruz OT GO 1    [id number] HC OT SELF CARE/MGMT/TRAIN EA 15 MIN 8/13/2020 Sonia Cruz OT GO 1               Sonia Cruz OT  8/13/2020

## 2020-08-13 NOTE — PLAN OF CARE
Problem: Patient Care Overview  Goal: Plan of Care Review  Outcome: Ongoing (interventions implemented as appropriate)  Flowsheets (Taken 8/13/2020 0541)  Progress: improving  Plan of Care Reviewed With: patient  Outcome Summary: IVF and IV abx. boot on left leg. c/o pain. Q2 turn. skin care provided. incontinence care. possible d/c to [facility] today. vss. will continue to monitor.     Problem: Fall Risk (Adult)  Goal: Identify Related Risk Factors and Signs and Symptoms  Description  Related risk factors and signs and symptoms are identified upon initiation of Human Response Clinical Practice Guideline (CPG).  Outcome: Ongoing (interventions implemented as appropriate)     Problem: Pain, Chronic (Adult)  Goal: Identify Related Risk Factors and Signs and Symptoms  Description  Related risk factors and signs and symptoms are identified upon initiation of Human Response Clinical Practice Guideline (CPG).  Outcome: Ongoing (interventions implemented as appropriate)

## 2020-08-13 NOTE — PROGRESS NOTES
Case Management Discharge Note      Final Note: DC to skilled bed at [place].         Destination - Selection Complete      Service Provider Request Status Selected Services Address Phone Number Fax Number    [facility] Selected Skilled Nursing [address] [place] [address] [phone]      Durable Medical Equipment      No service has been selected for the patient.      Dialysis/Infusion      No service has been selected for the patient.      Home Medical Care      No service has been selected for the patient.      Therapy      No service has been selected for the patient.      Community Resources      No service has been selected for the patient.        Transportation Services  W/C Van: [facility]    Final Discharge Disposition Code: 03 - skilled nursing facility (SNF)([place])

## 2020-08-13 NOTE — PROGRESS NOTES
"Daily progress note    Chief complaint  Doing better  No new complaints  Concerned about his wife who admitted with right hemiparesis    History of present illness  89-year-old white male with history of Parkinson's disease stroke neuropathy movement disorder depression osteoarthritis hyperlipidemia who lives at home with his wife brought to the emergency room with multiple falls generalized weakness left ankle foot pain.  Patient has injured the foot few days ago.  Patient fully alert oriented complaining of pain at the injury site but no other complaint.  Patient evaluated in ER found to have possible infected surgical wound as he has recent surgery on right cheek with some drainage but no fever chills.  Patient also have distal left fibula fracture and left foot sprain.  Patient has multiple bruising and contusion of the scalp on evaluation.  Patient admitted for management     REVIEW OF SYSTEMS  Unremarkable     PHYSICAL EXAM  Blood pressure 115/74, pulse 70, temperature 97.5 °F (36.4 °C), temperature source Oral, resp. rate 18, height 182.9 cm (72.01\"), weight 77.1 kg (170 lb), SpO2 97 %.    GENERAL: Not distressed  HENT: Mild bruising  EYES: no scleral icterus, PERRLA, extraocular movements intact  CV: regular rhythm, regular rate  RESPIRATORY: normal effort CTA B  ABDOMEN: soft, nontender, nondistended  MUSCULOSKELETAL: Significant amount of ecchymosis and 2+ pitting edema to the left foot and ankle.  DP and PT pulses are 1+, sensation is intact distally and cap refill is less than 2 seconds.  Limited range of motion of the left ankle due to pain.  There is no proximal tib-fib tenderness or metatarsal tenderness.  NEURO: alert, moves all extremities, follows commands  SKIN: warm, dry     LAB RESULTS  Lab Results (last 24 hours)     Procedure Component Value Units Date/Time    Basic Metabolic Panel [[id number]]  (Abnormal) Collected:  08/13/20 0639    Specimen:  Blood Updated:  08/13/20 0750     Glucose 100 " mg/dL      BUN 12 mg/dL      Creatinine 0.70 mg/dL      Sodium 136 mmol/L      Potassium 3.8 mmol/L      Chloride 104 mmol/L      CO2 25.4 mmol/L      Calcium 8.6 mg/dL      eGFR Non African Amer 105 mL/min/1.73      BUN/Creatinine Ratio 17.1     Anion Gap 6.6 mmol/L     Narrative:       GFR Normal >60  Chronic Kidney Disease <60  Kidney Failure <15      CBC (No Diff) [[id number]]  (Abnormal) Collected:  08/13/20 0639    Specimen:  Blood Updated:  08/13/20 0727     WBC 4.96 10*3/mm3      RBC 3.10 10*6/mm3      Hemoglobin 10.2 g/dL      Hematocrit 30.0 %      MCV 96.8 fL      MCH 32.9 pg      MCHC 34.0 g/dL      RDW 14.3 %      RDW-SD 49.3 fl      MPV 9.7 fL      Platelets 228 10*3/mm3         Imaging Results (Last 24 Hours)     ** No results found for the last 24 hours. **        ECG 12 Lead        HEART RATE= 71  bpm  RR Interval= 876  ms  WY Interval= 177  ms  P Horizontal Axis=   deg  P Front Axis= -16  deg  QRSD Interval= 86  ms  QT Interval= 416  ms  QRS Axis= -15  deg  T Wave Axis= 24  deg  - OTHERWISE NORMAL ECG -  Sinus rhythm  Ventricular premature complex  Borderline left axis deviation  NO PRIOR TRACING AVAILABLE FOR COMPARISON             Current Facility-Administered Medications:   •  acetaminophen (TYLENOL) tablet 650 mg, 650 mg, Oral, Q6H PRN, Samuel Arroyo MD  •  aspirin-dipyridamole (AGGRENOX)  MG per 12 hr capsule 1 capsule, 1 capsule, Oral, BID, Samuel Arroyo MD, 1 capsule at 08/13/20 0829  •  brimonidine-timolol (COMBIGAN) 0.2-0.5 % ophthalmic solution 1 drop, 1 drop, Both Eyes, Q12H, Samuel Arroyo MD, 1 drop at 08/12/20 1202  •  carbidopa-levodopa (SINEMET)  MG per tablet 2 tablet, 2 tablet, Oral, TID, Samuel Arroyo MD, 2 tablet at 08/13/20 0829  •  ceFAZolin (ANCEF) IVPB 1 g, 1 g, Intravenous, Q8H, Samuel Arroyo MD, Stopped at 08/13/20 0802  •  docusate sodium (COLACE) capsule 100 mg, 100 mg, Oral, Daily, Samuel Arroyo MD, 100 mg at 08/13/20 0829  •  gabapentin (NEURONTIN) capsule  600 mg, 600 mg, Oral, Q8H, Elvia Arroyo MD, 600 mg at 08/13/20 0538  •  hydrocortisone-bacitracin-zinc oxide-nystatin (MAGIC BARRIER) cream 1 application, 1 application, Topical, BID, Elvia Arroyo MD, 1 application at 08/13/20 0829  •  oxyCODONE-acetaminophen (PERCOCET)  MG per tablet 1 tablet, 1 tablet, Oral, Q6H PRN, Elvia Arroyo MD, 1 tablet at 08/13/20 0829  •  pantoprazole (PROTONIX) EC tablet 40 mg, 40 mg, Oral, Q AM, Elvia Arroyo MD, 40 mg at 08/13/20 0538  •  rosuvastatin (CRESTOR) tablet 5 mg, 5 mg, Oral, Daily, Elvia Arroyo MD, 5 mg at 08/13/20 0829     ASSESSMENT  Left distal fibula fracture  Left foot sprain  Status post multiple falls  Right cheek wound infection  Parkinson disease  Movement disorder  Status post CVA in the past  Hyperlipidemia  Neuropathy  Osteoarthritis  Gastroesophageal reflux disease    PLAN  Discharge to subacute rehab   Discharge summary dictated    ELVIA ARROYO MD

## 2020-08-13 NOTE — PLAN OF CARE
Problem: Patient Care Overview  Goal: Plan of Care Review  Outcome: Ongoing (interventions implemented as appropriate)  Flowsheets  Taken 8/13/2020 1211  Progress: no change  Plan of Care Reviewed With: patient  Taken 8/13/2020 1206  Outcome Summary: Pt agitated and questions participation in PT; Pt req min A for bed mobility and sit/stand transfer. Pt able to take 4-6 steps from EOB to recliner with min A and constant vc for sequencing. Pt amb very slowly and is hesitant to WB L LE. Pt easily agitated and repeatedly requests to be notified of what is going to happen. Cont PT for ther ex, gt/transfer training, bed mobility, balance, and activity tolerance as appropriate.   Patient was intermittently wearing a face mask during this therapy encounter. Therapist used appropriate personal protective equipment including eye protection, mask, and gloves.  Mask used was standard procedure mask. Appropriate PPE was worn during the entire therapy session. Hand hygiene was completed before and after therapy session. Patient is not in enhanced droplet precautions.

## 2020-08-13 NOTE — PLAN OF CARE
Problem: Patient Care Overview  Goal: Plan of Care Review  Flowsheets (Taken 8/13/2020 0846)  Outcome Summary: Pt is a 89 y.o male admitted to [facility] from home after fall at home resulting in closed fx of fibula. Pt is limited in ADL performance secondary to pain, functional mobility, and transfer status. Pt encouraged to complete OOB or sitting EOB with OT with pt declining and completes grooming ADLs reclined in bed with set up. Pt would require max A for lower body ADLs. Pt plans to d/c to SNF to subacte rehab prior to returning home with wife.   Note:   Appropriate PPE worn throughout the encounter with therapist in mask, gloves, safety glasses, and performed hand hygiene prior and after encounter. Pt wore mask throughout with except of performing facial grooming. Pt not on advanced droplet precautions.

## 2020-08-13 NOTE — PROGRESS NOTES
Continued Stay Note  [facility]     Patient Name: Anderson Quick  MRN: [id number]  Today's Date: 8/13/2020    Admit Date: 8/10/2020    Discharge Plan     Row Name 08/13/20 1204       Plan    Plan  Approved at [facility].  Precert received today.    Plan Comments  MD notified. S/W pt who is in agreement with [facility] today.        Discharge Codes    No documentation.             Jacinda Lovell RN

## 2020-08-13 NOTE — THERAPY TREATMENT NOTE
Patient Name: Anderson Quick  : 10/16/1927    MRN: [id number]                              Today's Date: 2020       Admit Date: 8/10/2020    Visit Dx:     ICD-10-CM ICD-9-CM   1. Generalized weakness R53.1 780.79   2. Closed fracture of distal end of left fibula, unspecified fracture morphology, initial encounter S82.832A 824.8   3. Foot sprain, left, initial encounter S93.602A 845.10   4. Infected surgical wound T81.49XA 998.59   5. Contusion of scalp, initial encounter S00.03XA 920     Patient Active Problem List   Diagnosis   • Generalized weakness     Past Medical History:   Diagnosis Date   • Arthritis    • Chronic pain    • Depression    • Movement disorder     Parkinsons   • Peripheral neuropathy    • Stroke (CMS/HCC)      Past Surgical History:   Procedure Laterality Date   • CARDIAC VALVE REPLACEMENT       General Information     Row Name 20 1158          PT Evaluation Time/Intention    Document Type  therapy note (daily note)  -DJ     Mode of Treatment  physical therapy;individual therapy  -DJ     Row Name 20 1158          General Information    Patient Profile Reviewed?  yes  -DJ     Existing Precautions/Restrictions  fall;weight bearing  -DJ     Row Name 20 1158          Cognitive Assessment/Intervention- PT/OT    Orientation Status (Cognition)  oriented x 3  -DJ     Cognitive Assessment/Intervention Comment  Agitated but agrees to participate in PT - thinks he is going to rehab today  -DJ     Row Name 20 1158          Safety Issues, Functional Mobility    Impairments Affecting Function (Mobility)  pain  -DJ     Comment, Safety Issues/Impairments (Mobility)  gt belt, grippy socks, walking boot  -DJ       User Key  (r) = Recorded By, (t) = Taken By, (c) = Cosigned By    Initials Name Provider Type    Talisha Spears, PT Physical Therapist        Mobility     Row Name 20 1159          Bed Mobility Assessment/Treatment    Bed Mobility Assessment/Treatment   supine-sit  -DJ     Supine-Sit [place] (Bed Mobility)  minimum assist (75% patient effort);verbal cues  -DJ     Sit-Supine [place] (Bed Mobility)  not tested  -DJ     Assistive Device (Bed Mobility)  bed rails;head of bed elevated  -DJ     Comment (Bed Mobility)  pt agitated and moves slowly - doesnt like to be helped  -DJ     Row Name 08/13/20 1159          Transfer Assessment/Treatment    Comment (Transfers)  sit/stand x 2 from EOB  -DJ     Row Name 08/13/20 1159          Bed-Chair Transfer    Bed-Chair [place]od (Transfers)  minimum assist (75% patient effort);verbal cues  -DJ     Assistive Device (Bed-Chair Transfers)  walker, front-wheeled  -DJ     Row Name 08/13/20 1159          Sit-Stand Transfer    Sit-Stand [place]od (Transfers)  minimum assist (75% patient effort);contact guard;verbal cues  -DJ     Assistive Device (Sit-Stand Transfers)  walker, front-wheeled  -DJ     Row Name 08/13/20 1159          Gait/Stairs Assessment/Training    Gait/Stairs Assessment/Training  gait/ambulation assistive device;distance ambulated  -DJ     Mc[place]od Level (Gait)  contact guard;minimum assist (75% patient effort);1 person assist;verbal cues  -DJ     Assistive Device (Gait)  walker, front-wheeled  -DJ     Distance in Feet (Gait)  Pt able to take 4-6 steps from EOB to recliner chair wiht r wx and min A and constant vc for sequencing. Pt moves very slowly and cautiously - difficulty L LE WBing due to pain, sig flexed posture  -DJ     Pattern (Gait)  step-to  -DJ     Deviations/Abnormal Patterns (Gait)  eliel decreased;stride length decreased;antalgic  -DJ     Bilateral Gait Deviations  forward flexed posture  -DJ     Left Sided Gait Deviations  weight shift ability decreased  -DJ     Mc[place]od Level (Stairs)  not tested  -DJ     Comment (Gait/Stairs)  Pt able to take 4-6 steps from EOB to recliner chair wiht r wx and min A and constant vc for sequencing. Pt moves very slowly and cautiously -  difficulty L LE WBing due to pain, sig flexed posture  -DJ       User Key  (r) = Recorded By, (t) = Taken By, (c) = Cosigned By    Initials Name Provider Type    Talisha Spears, BERNARDA Physical Therapist        Obj/Interventions     Row Name 08/13/20 1205          Therapeutic Exercise    Sets/Reps (Therapeutic Exercise)  10x  -DJ     Comment (Therapeutic Exercise)  LAQ, seated hip flex with vc   -DJ     Row Name 08/13/20 1205          Static Sitting Balance    Level of [place] (Unsupported Sitting, Static Balance)  supervision  -DJ     Sitting Position (Unsupported Sitting, Static Balance)  sitting on edge of bed  -DJ     Time Able to Maintain Position (Unsupported Sitting, Static Balance)  1 to 2 minutes  -DJ     Row Name 08/13/20 1205          Static Standing Balance    Level of [place] (Supported Standing, Static Balance)  minimal assist, 75% patient effort;contact guard assist  -DJ     Time Able to Maintain Position (Supported Standing, Static Balance)  15 to 30 seconds  -DJ     Assistive Device Utilized (Supported Standing, Static Balance)  walker, rolling  -DJ       User Key  (r) = Recorded By, (t) = Taken By, (c) = Cosigned By    Initials Name Provider Type    Talisha Spears, PT Physical Therapist        Goals/Plan    No documentation.       Clinical Impression     Row Name 08/13/20 1206          Pain Assessment    Additional Documentation  Pain Scale: FACES Pre/Post-Treatment (Group)  -DJ     [facility] Name 08/13/20 1206          Pain Scale: Numbers Pre/Post-Treatment    Pain Scale: Numbers, Pretreatment  6/10  -DJ     Pain Location - Side  Left  -DJ     Pain Location  ankle  -DJ     Pre/Post Treatment Pain Comment  expected c/o pain with WBing   -DJ     Pain Intervention(s)  Repositioned;Rest  -DJ     Row Name 08/13/20 1206          Plan of Care Review    Plan of Care Reviewed With  patient  -DJ     Progress  no change  -DJ     Outcome Summary  Pt agitated and questions participation in PT; Pt req min A  for bed mobility and sit/stand trnasfer. Pt able to take 4-6 steps from EOB to recliner with min A and constant vc for sequencing. Pt amb very slowly and is hesitant to WB L LE. Pt easily agitated and repeated requests to be notified of what is going to happen. Cont PT for ther ex, gt/transfer training, bed mobility, balance, and activity tolerance as appropriate.   -DJ     Row Name 08/13/20 1206          Physical Therapy Clinical Impression    Patient/Family Goals Statement (PT Clinical Impression)  Rehab  -DJ     Criteria for Skilled Interventions Met (PT Clinical Impression)  yes;treatment indicated  -DJ     Row Name 08/13/20 1206          Vital Signs    O2 Delivery Pre Treatment  room air  -DJ     O2 Delivery Intra Treatment  room air  -DJ     O2 Delivery Post Treatment  room air  -DJ     Pre Patient Position  Supine  -DJ     Intra Patient Position  Standing  -DJ     Post Patient Position  Sitting  -DJ     Row Name 08/13/20 1206          Positioning and Restraints    Pre-Treatment Position  in bed  -DJ     Post Treatment Position  chair  -DJ     In Chair  notified nsg;reclined;call light within reach;encouraged to call for assist;exit alarm on;LLE elevated;legs elevated  -DJ       User Key  (r) = Recorded By, (t) = Taken By, (c) = Cosigned By    Initials Name Provider Type    Talisha Spears, PT Physical Therapist        Outcome Measures     Row Name 08/13/20 1210          How much help from another person do you currently need...    Turning from your back to your side while in flat bed without using bedrails?  3  -DJ     Moving from lying on back to sitting on the side of a flat bed without bedrails?  3  -DJ     Moving to and from a bed to a chair (including a wheelchair)?  3  -DJ     Standing up from a chair using your arms (e.g., wheelchair, bedside chair)?  3  -DJ     Climbing 3-5 steps with a railing?  1  -DJ     To walk in hospital room?  2  -DJ     AM-PAC 6 Clicks Score (PT)  15  -DJ     Row Name 08/13/20  1210          Functional Assessment    Outcome Measure Options  AM-PAC 6 Clicks Basic Mobility (PT)  -DJ       User Key  (r) = Recorded By, (t) = Taken By, (c) = Cosigned By    Initials Name Provider Type    Talisha Spears, BERNARDA Physical Therapist        Physical Therapy Education                 Title: PT OT SLP Therapies (In Progress)     Topic: Physical Therapy (In Progress)     Point: Mobility training (In Progress)     Description:   Instruct learner(s) on safety and technique for assisting patient out of bed, chair or wheelchair.  Instruct in the proper use of assistive devices, such as walker, crutches, cane or brace.              Patient Friendly Description:   It's important to get you on your feet again, but we need to do so in a way that is safe for you. Falling has serious consequences, and your personal safety is the most important thing of all.        When it's time to get out of bed, one of us or a family member will sit next to you on the bed to give you support.     If your doctor or nurse tells you to use a walker, crutches, a cane, or a brace, be sure you use it every time you get out of bed, even if you think you don't need it.    Learning Progress Summary           Patient Acceptance, E,D, NR,NL by IRINEO at 8/13/2020 1211    Acceptance, E,TB,D, VU,NR by  at 8/12/2020 1449    Acceptance, E,TB,D, VU,NR by  at 8/11/2020 1658                   Point: Home exercise program (In Progress)     Description:   Instruct learner(s) on appropriate technique for monitoring, assisting and/or progressing patient with therapeutic exercises and activities.              Learning Progress Summary           Patient Acceptance, E,D, NR,NL by IRINEO at 8/13/2020 1211    Acceptance, E,TB,D, VU,NR by  at 8/12/2020 1449    Acceptance, E,TB,D, VU,NR by  at 8/11/2020 1658                   Point: Body mechanics (In Progress)     Description:   Instruct learner(s) on proper positioning and spine alignment for patient and/or  caregiver during mobility tasks and/or exercises.              Learning Progress Summary           Patient Acceptance, E,D, NR,NL by  at 8/13/2020 1211    Acceptance, E,TB,D, VU,NR by  at 8/12/2020 1449    Acceptance, E,TB,D, VU,NR by  at 8/11/2020 1658                   Point: Precautions (In Progress)     Description:   Instruct learner(s) on prescribed precautions during mobility and gait tasks              Learning Progress Summary           Patient Acceptance, E,D, NR,NL by  at 8/13/2020 1211    Acceptance, E,TB,D, VU,NR by  at 8/12/2020 1449    Acceptance, E,TB,D, VU,NR by  at 8/11/2020 1658                               User Key     Initials Effective Dates Name Provider Type Discipline     04/03/18 -  Franny Simon, PT Physical Therapist PT     03/07/18 -  Sonia Black, PTA Physical Therapy Assistant PT     10/25/19 -  Talisha Goss, PT Physical Therapist PT              PT Recommendation and Plan     Outcome Summary/Treatment Plan (PT)  Anticipated Discharge Disposition (PT): skilled nursing facility  Plan of Care Reviewed With: patient  Progress: no change  Outcome Summary: Pt agitated and questions participation in PT; Pt req min A for bed mobility and sit/stand trnasfer. Pt able to take 4-6 steps from EOB to recliner with min A and constant vc for sequencing. Pt amb very slowly and is hesitant to WB L LE. Pt easily agitated and repeated requests to be notified of what is going to happen. Cont PT for ther ex, gt/transfer training, bed mobility, balance, and activity tolerance as appropriate.      Time Calculation:   PT Charges     Row Name 08/13/20 1212             Time Calculation    Start Time  1129  -DJ      Stop Time  1157  -DJ      Time Calculation (min)  28 min  -DJ      PT Non-Billable Time (min)  10 min  -DJ      PT Received On  08/13/20  -DJ      PT - Next Appointment  08/14/20  -DJ        User Key  (r) = Recorded By, (t) = Taken By, (c) = Cosigned By    Initials Name  Provider Type    DJ Talisha Goss, PT Physical Therapist        Therapy Charges for Today     Code Description Service Date Service Provider Modifiers Qty    80857533359 HC PT THER PROC EA 15 MIN 8/13/2020 Talisha Goss, PT GP 1    96682735515 HC GAIT TRAINING EA 15 MIN 8/13/2020 Talisha Goss, PT GP 1          PT G-Codes  Outcome Measure Options: AM-PAC 6 Clicks Basic Mobility (PT)  AM-PAC 6 Clicks Score (PT): 15  AM-PAC 6 Clicks Score (OT): 16    Talisha Goss PT  8/13/2020

## 2020-08-13 NOTE — DISCHARGE SUMMARY
Discharge summary    Date of admission 8/10/2020  Date of discharge 8/13/2020    Final diagnoses  Left distal fibula fracture nonsurgical treatment  Left foot sprain  Status post multiple falls  Right cheek wound infection resolving  Movement disorder  Status post CVA in the past  Hyperlipidemia  Neuropathy  Osteoarthritis  Gastroesophageal reflux disease    Discharge medications    Current Facility-Administered Medications:   •  aspirin-dipyridamole (AGGRENOX)  MG per 12 hr capsule 1 capsule, 1 capsule, Oral, BID, Samuel Arroyo MD, 1 capsule at 08/13/20 0829  •  brimonidine-timolol (COMBIGAN) 0.2-0.5 % ophthalmic solution 1 drop, 1 drop, Both Eyes, Q12H, Samuel Arroyo MD, 1 drop at 08/12/20 1202  •  carbidopa-levodopa (SINEMET)  MG per tablet 2 tablet, 2 tablet, Oral, TID, Samuel Arroyo MD, 2 tablet at 08/13/20 0829  •  cephalexin (KEFLEX) capsule 500 mg, 500 mg, Oral, Q8H, Samuel Arroyo MD  •  docusate sodium (COLACE) capsule 100 mg, 100 mg, Oral, Daily, Samuel Arroyo MD, 100 mg at 08/13/20 0829  •  gabapentin (NEURONTIN) capsule 600 mg, 600 mg, Oral, Q8H, Samuel Arroyo MD, 600 mg at 08/13/20 0538  •  hydrocortisone-bacitracin-zinc oxide-nystatin (MAGIC BARRIER) cream 1 application, 1 application, Topical, BID, Samuel Arroyo MD, 1 application at 08/13/20 0829  •  pantoprazole (PROTONIX) EC tablet 40 mg, 40 mg, Oral, Q AM, Samuel Arroyo MD, 40 mg at 08/13/20 0538  •  rosuvastatin (CRESTOR) tablet 5 mg, 5 mg, Oral, Daily, Samuel Arroyo MD, 5 mg at 08/13/20 0829     Consults obtained  Orthopedic surgery    Procedures  None    Hospital course  89 white male with history of movement disorder old CVA neuropathy and osteoarthritis brought to the emergency room with multiple fall generalized weakness and left ankle and foot pain.  Patient work-up revealed left distal fibula fracture and also have right cheek wound infection.  Patient admitted treated with empiric antibiotics and orthopedic surgery consult  obtained.  Patient evaluated by orthopedic surgery and recommend to remove his a splint and placing him below knee fracture boot with weightbearing as tolerated.  Patient improved with supportive care and medication adjusted.  Patient antibiotics changed to by mouth and agrees to go to subacute rehab for PT OT and nursing care.  Patient pain well controlled with current medication and he is hemodynamically stable and all office labs at baseline with a hemoglobin of 10.2 and potassium 3.8.  Patient remained DNR throughout hospital course.    Discharge diet regular    Activity per PT OT    Medication as above    Further care per accepting physician at subacute rehab    ELVIA MCCLAIN MD